# Patient Record
Sex: FEMALE | Race: BLACK OR AFRICAN AMERICAN | NOT HISPANIC OR LATINO | URBAN - METROPOLITAN AREA
[De-identification: names, ages, dates, MRNs, and addresses within clinical notes are randomized per-mention and may not be internally consistent; named-entity substitution may affect disease eponyms.]

---

## 2021-10-12 ENCOUNTER — EMERGENCY (EMERGENCY)
Facility: HOSPITAL | Age: 81
LOS: 1 days | Discharge: HOME | End: 2021-10-12
Attending: EMERGENCY MEDICINE | Admitting: EMERGENCY MEDICINE
Payer: MEDICARE

## 2021-10-12 VITALS
HEART RATE: 88 BPM | DIASTOLIC BLOOD PRESSURE: 55 MMHG | TEMPERATURE: 99 F | SYSTOLIC BLOOD PRESSURE: 113 MMHG | RESPIRATION RATE: 18 BRPM | OXYGEN SATURATION: 97 %

## 2021-10-12 PROCEDURE — 99285 EMERGENCY DEPT VISIT HI MDM: CPT | Mod: 25

## 2021-10-12 PROCEDURE — 93010 ELECTROCARDIOGRAM REPORT: CPT

## 2021-10-12 RX ORDER — ACETAMINOPHEN 500 MG
650 TABLET ORAL ONCE
Refills: 0 | Status: DISCONTINUED | OUTPATIENT
Start: 2021-10-12 | End: 2021-10-13

## 2021-10-12 NOTE — ED ADULT TRIAGE NOTE - DOMESTIC TRAVEL HIGH RISK QUESTION
Health Maintenance Summary     Topic Due On Due Status Completed On    IMMUNIZATION - DTaP/Tdap/Td Jun 16, 2024 Not Due Jun 16, 2014    Immunization-Influenza  Completed Nov 20, 2017    Depression Screening Dec 12, 2018 Not Due Dec 12, 2017          Patient is up to date, no discussion needed .             No

## 2021-10-12 NOTE — ED ADULT NURSE NOTE - NSIMPLEMENTINTERV_GEN_ALL_ED
Implemented All Fall Risk Interventions:  [place] to call system. Call bell, personal items and telephone within reach. Instruct patient to call for assistance. Room bathroom lighting operational. Non-slip footwear when patient is off stretcher. Physically safe environment: no spills, clutter or unnecessary equipment. Stretcher in lowest position, wheels locked, appropriate side rails in place. Provide visual cue, wrist band, yellow gown, etc. Monitor gait and stability. Monitor for mental status changes and reorient to person, place, and time. Review medications for side effects contributing to fall risk. Reinforce activity limits and safety measures with patient and family.

## 2021-10-12 NOTE — ED ADULT TRIAGE NOTE - CHIEF COMPLAINT QUOTE
p/t c/o of back pain s/p loss of balance and fall this afternoon, neg loc, witnessed by   p/t baseline with dementia, awake and responsive,

## 2021-10-12 NOTE — ED ADULT NURSE NOTE - OBJECTIVE STATEMENT
80yo female received in room 29. pt A&Ox2, baseline dementia, c/o back pain after fall this afternoon, pt states she lose her balance and hit her head, denies loc or blood thinner use. no apparent trauma noted. respiration even and non-labored. In nad. MD kasper in progress. family at bedside. Side rails up, bed at lowest position, call bell within reach, patient oriented to the unit, safety maintained.

## 2021-10-13 VITALS
OXYGEN SATURATION: 100 % | TEMPERATURE: 98 F | HEART RATE: 60 BPM | SYSTOLIC BLOOD PRESSURE: 111 MMHG | RESPIRATION RATE: 18 BRPM | DIASTOLIC BLOOD PRESSURE: 55 MMHG

## 2021-10-13 LAB
ALBUMIN SERPL ELPH-MCNC: 4.1 G/DL — SIGNIFICANT CHANGE UP (ref 3.3–5)
ALP SERPL-CCNC: 62 U/L — SIGNIFICANT CHANGE UP (ref 40–120)
ALT FLD-CCNC: 27 U/L — SIGNIFICANT CHANGE UP (ref 4–33)
ANION GAP SERPL CALC-SCNC: 15 MMOL/L — HIGH (ref 7–14)
ANISOCYTOSIS BLD QL: SLIGHT — SIGNIFICANT CHANGE UP
APPEARANCE UR: ABNORMAL
AST SERPL-CCNC: 23 U/L — SIGNIFICANT CHANGE UP (ref 4–32)
BACTERIA # UR AUTO: NEGATIVE — SIGNIFICANT CHANGE UP
BASOPHILS # BLD AUTO: 0.02 K/UL — SIGNIFICANT CHANGE UP (ref 0–0.2)
BASOPHILS # BLD AUTO: 0.03 K/UL — SIGNIFICANT CHANGE UP (ref 0–0.2)
BASOPHILS NFR BLD AUTO: 0.2 % — SIGNIFICANT CHANGE UP (ref 0–2)
BASOPHILS NFR BLD AUTO: 0.3 % — SIGNIFICANT CHANGE UP (ref 0–2)
BILIRUB SERPL-MCNC: 0.7 MG/DL — SIGNIFICANT CHANGE UP (ref 0.2–1.2)
BILIRUB UR-MCNC: NEGATIVE — SIGNIFICANT CHANGE UP
BUN SERPL-MCNC: 14 MG/DL — SIGNIFICANT CHANGE UP (ref 7–23)
CALCIUM SERPL-MCNC: 9.9 MG/DL — SIGNIFICANT CHANGE UP (ref 8.4–10.5)
CHLORIDE SERPL-SCNC: 100 MMOL/L — SIGNIFICANT CHANGE UP (ref 98–107)
CO2 SERPL-SCNC: 22 MMOL/L — SIGNIFICANT CHANGE UP (ref 22–31)
COLOR SPEC: SIGNIFICANT CHANGE UP
CREAT SERPL-MCNC: 0.63 MG/DL — SIGNIFICANT CHANGE UP (ref 0.5–1.3)
DIFF PNL FLD: NEGATIVE — SIGNIFICANT CHANGE UP
EOSINOPHIL # BLD AUTO: 0.01 K/UL — SIGNIFICANT CHANGE UP (ref 0–0.5)
EOSINOPHIL # BLD AUTO: 0.04 K/UL — SIGNIFICANT CHANGE UP (ref 0–0.5)
EOSINOPHIL NFR BLD AUTO: 0.1 % — SIGNIFICANT CHANGE UP (ref 0–6)
EOSINOPHIL NFR BLD AUTO: 0.4 % — SIGNIFICANT CHANGE UP (ref 0–6)
EPI CELLS # UR: 3 /HPF — SIGNIFICANT CHANGE UP (ref 0–5)
GLUCOSE SERPL-MCNC: 260 MG/DL — HIGH (ref 70–99)
GLUCOSE UR QL: ABNORMAL
HCT VFR BLD CALC: 38.5 % — SIGNIFICANT CHANGE UP (ref 34.5–45)
HCT VFR BLD CALC: 39.7 % — SIGNIFICANT CHANGE UP (ref 34.5–45)
HGB BLD-MCNC: 12.9 G/DL — SIGNIFICANT CHANGE UP (ref 11.5–15.5)
HGB BLD-MCNC: 14.2 G/DL — SIGNIFICANT CHANGE UP (ref 11.5–15.5)
HYALINE CASTS # UR AUTO: 1 /LPF — SIGNIFICANT CHANGE UP (ref 0–7)
IANC: 6.91 K/UL — SIGNIFICANT CHANGE UP (ref 1.5–8.5)
IANC: 8.26 K/UL — SIGNIFICANT CHANGE UP (ref 1.5–8.5)
IMM GRANULOCYTES NFR BLD AUTO: 0.8 % — SIGNIFICANT CHANGE UP (ref 0–1.5)
IMM GRANULOCYTES NFR BLD AUTO: 0.9 % — SIGNIFICANT CHANGE UP (ref 0–1.5)
KETONES UR-MCNC: NEGATIVE — SIGNIFICANT CHANGE UP
LEUKOCYTE ESTERASE UR-ACNC: NEGATIVE — SIGNIFICANT CHANGE UP
LYMPHOCYTES # BLD AUTO: 0.92 K/UL — LOW (ref 1–3.3)
LYMPHOCYTES # BLD AUTO: 1.36 K/UL — SIGNIFICANT CHANGE UP (ref 1–3.3)
LYMPHOCYTES # BLD AUTO: 15.1 % — SIGNIFICANT CHANGE UP (ref 13–44)
LYMPHOCYTES # BLD AUTO: 9.4 % — LOW (ref 13–44)
MANUAL SMEAR VERIFICATION: SIGNIFICANT CHANGE UP
MCHC RBC-ENTMCNC: 29.7 PG — SIGNIFICANT CHANGE UP (ref 27–34)
MCHC RBC-ENTMCNC: 31.5 PG — SIGNIFICANT CHANGE UP (ref 27–34)
MCHC RBC-ENTMCNC: 33.5 GM/DL — SIGNIFICANT CHANGE UP (ref 32–36)
MCHC RBC-ENTMCNC: 35.8 GM/DL — SIGNIFICANT CHANGE UP (ref 32–36)
MCV RBC AUTO: 88 FL — SIGNIFICANT CHANGE UP (ref 80–100)
MCV RBC AUTO: 88.7 FL — SIGNIFICANT CHANGE UP (ref 80–100)
MICROCYTES BLD QL: SLIGHT — SIGNIFICANT CHANGE UP
MONOCYTES # BLD AUTO: 0.5 K/UL — SIGNIFICANT CHANGE UP (ref 0–0.9)
MONOCYTES # BLD AUTO: 0.61 K/UL — SIGNIFICANT CHANGE UP (ref 0–0.9)
MONOCYTES NFR BLD AUTO: 5.1 % — SIGNIFICANT CHANGE UP (ref 2–14)
MONOCYTES NFR BLD AUTO: 6.8 % — SIGNIFICANT CHANGE UP (ref 2–14)
NEUTROPHILS # BLD AUTO: 6.91 K/UL — SIGNIFICANT CHANGE UP (ref 1.8–7.4)
NEUTROPHILS # BLD AUTO: 8.26 K/UL — HIGH (ref 1.8–7.4)
NEUTROPHILS NFR BLD AUTO: 76.7 % — SIGNIFICANT CHANGE UP (ref 43–77)
NEUTROPHILS NFR BLD AUTO: 84.2 % — HIGH (ref 43–77)
NITRITE UR-MCNC: NEGATIVE — SIGNIFICANT CHANGE UP
NRBC # BLD: 0 /100 WBCS — SIGNIFICANT CHANGE UP
NRBC # BLD: 0 /100 WBCS — SIGNIFICANT CHANGE UP
NRBC # FLD: 0 K/UL — SIGNIFICANT CHANGE UP
NRBC # FLD: 0 K/UL — SIGNIFICANT CHANGE UP
PH UR: 6 — SIGNIFICANT CHANGE UP (ref 5–8)
PLAT MORPH BLD: ABNORMAL
PLATELET # BLD AUTO: 143 K/UL — LOW (ref 150–400)
PLATELET # BLD AUTO: 71 K/UL — LOW (ref 150–400)
PLATELET CLUMP BLD QL SMEAR: ABNORMAL
PLATELET COUNT - ESTIMATE: ADEQUATE — SIGNIFICANT CHANGE UP
POTASSIUM SERPL-MCNC: 4.4 MMOL/L — SIGNIFICANT CHANGE UP (ref 3.5–5.3)
POTASSIUM SERPL-SCNC: 4.4 MMOL/L — SIGNIFICANT CHANGE UP (ref 3.5–5.3)
PROT SERPL-MCNC: 7.3 G/DL — SIGNIFICANT CHANGE UP (ref 6–8.3)
PROT UR-MCNC: NEGATIVE — SIGNIFICANT CHANGE UP
RBC # BLD: 4.34 M/UL — SIGNIFICANT CHANGE UP (ref 3.8–5.2)
RBC # BLD: 4.51 M/UL — SIGNIFICANT CHANGE UP (ref 3.8–5.2)
RBC # FLD: 13 % — SIGNIFICANT CHANGE UP (ref 10.3–14.5)
RBC # FLD: 13.1 % — SIGNIFICANT CHANGE UP (ref 10.3–14.5)
RBC BLD AUTO: ABNORMAL
RBC CASTS # UR COMP ASSIST: 5 /HPF — HIGH (ref 0–4)
SODIUM SERPL-SCNC: 137 MMOL/L — SIGNIFICANT CHANGE UP (ref 135–145)
SP GR SPEC: 1.02 — SIGNIFICANT CHANGE UP (ref 1–1.05)
UROBILINOGEN FLD QL: SIGNIFICANT CHANGE UP
WBC # BLD: 9.01 K/UL — SIGNIFICANT CHANGE UP (ref 3.8–10.5)
WBC # BLD: 9.81 K/UL — SIGNIFICANT CHANGE UP (ref 3.8–10.5)
WBC # FLD AUTO: 9.01 K/UL — SIGNIFICANT CHANGE UP (ref 3.8–10.5)
WBC # FLD AUTO: 9.81 K/UL — SIGNIFICANT CHANGE UP (ref 3.8–10.5)
WBC UR QL: 5 /HPF — SIGNIFICANT CHANGE UP (ref 0–5)

## 2021-10-13 PROCEDURE — 70450 CT HEAD/BRAIN W/O DYE: CPT | Mod: 26

## 2021-10-13 RX ORDER — ACETAMINOPHEN 500 MG
1000 TABLET ORAL ONCE
Refills: 0 | Status: COMPLETED | OUTPATIENT
Start: 2021-10-13 | End: 2021-10-13

## 2021-10-13 RX ADMIN — Medication 400 MILLIGRAM(S): at 00:51

## 2021-10-13 NOTE — ED PROVIDER NOTE - OBJECTIVE STATEMENT
80YO F hx of dementia, HTN, DM, p/w fall onto head and back. felt off balance while walking, fell from standing height, head and upper back hit the ground. pt ambulatory after. no change to baseline mental status. pt denying headache, neck pain, endorses T spine pain, no focal neuro deficits. not on AC. denies LOC.

## 2021-10-13 NOTE — ED PROVIDER NOTE - ATTENDING CONTRIBUTION TO CARE
82 yo with dementia HTN DM with a mechanical fall after falling from standing due to losing her balance while walking.  Head and back hit ground.   There was no LOC.  Was able to ambulate after.  No HA and no AC.    Gen: Well appearing in NAD  Head: NC/AT  Neck: trachea midline  Resp:  No distress  Ext: no deformities  Neuro:  A&O appears non focal  Skin:  Warm and dry as visualized  Psych:  Normal affect and mood     82 yo with mechanical fall.  Will get imaging to look for bleed or fracture.  UA and labs to look for other etiologies.

## 2021-10-13 NOTE — ED PROVIDER NOTE - CARE PLAN
1 Principal Discharge DX:	Back pain  Secondary Diagnosis:	Accident due to mechanical fall without injury

## 2021-10-13 NOTE — PROVIDER CONTACT NOTE (OTHER) - ACTION/TREATMENT ORDERED:
[facility] contacted [facility] to schedule ambulette transport;  arranged for 8:00 a.m. trip # [phone].  Pt to pay upon receipt of bill, if not the bill to [facility].

## 2021-10-13 NOTE — PROVIDER CONTACT NOTE (OTHER) - ASSESSMENT
Pt's  at bedside reports they have no one to pick them up; pt is unsteady on her feet.  Pt and  agreeable to ambulette transport home; they are requesting to be billed for the ambulette trip as they do not have the cash on them at this time.

## 2021-10-13 NOTE — ED PROVIDER NOTE - CLINICAL SUMMARY MEDICAL DECISION MAKING FREE TEXT BOX
Leslie Jacobson MD, PGY-2: 80YO F hx of dementia, HTN, DM, p/w mechanical fall onto head and back. not on AC. VS: initially bradycardic 47, now improved and stable. PE: no midline spinal TTP, no scalp hematoma. low suspicion intracranial hemorrhage or c spine fracture given VS and PE, but will get CT head and C spine based on mechanism of injury. low suspicion syncope but given hx of dementia, plan for basic labs and ua.

## 2021-10-13 NOTE — ED PROVIDER NOTE - NS ED ROS FT
Gen: Denies fevers  HEENT: denies h/a, neck pain  CV: Denies chest pain  Skin: denies bruising, bleeding  Resp: Denies SOB, cough  GI: Denies nausea, vomiting  Msk: + middle back pain  : Denies dysuria  Neuro: Denies LOC  Psych: Denies changes to baseline mental status

## 2021-10-13 NOTE — ED PROVIDER NOTE - PHYSICAL EXAMINATION
Gen: WDWN, NAD  HEENT: EOMI, no nasal discharge, mucous membranes moist. no nelson sign, no septal hematoma.   CV: 2+ radial pulses b/l  Resp: no accessory muscle use, no increased work of breathing  GI: Abdomen soft non-distended, NTTP  MSK: No open wounds, no bruising, no LE edema. no midline spine TTP. no c spine TTP, no pelvic instability   Neuro: A&Ox2, following commands, moving all four extremities spontaneously  Psych: appropriate mood

## 2021-10-13 NOTE — ED PROVIDER NOTE - PATIENT PORTAL LINK FT
You can access the FollowMyHealth Patient Portal offered by [facility] by registering at the following website: [email or web address]. By joining [facility]’s FollowMyHealth portal, you will also be able to view your health information using other applications (apps) compatible with our system.

## 2021-10-13 NOTE — ED PROVIDER NOTE - NSFOLLOWUPINSTRUCTIONS_ED_ALL_ED_FT
--take tylenol or motrin as needed for pain. Take tylenol 650 mg every 6 hours alternating with motrin 600 mg every 6 hours (take tylenol or motrin then three hours later take the other then three hours later take the first).  --today, the lab tests we did include blood and urine studies, the imaging tests we did include CT head. results significant for no abnormalities. we have included these test results in your paperwork. please take them to your follow-up appointment  --given that you were in the ED today, we recommend a followup visit with your general doctor (primary care doctor) within 7 days.   --your diagnosis is: back pain, mechanical fall  --return to the ED if recurrent falls, if nausea/vomiting, blurry vision, if changes to baseline mental status

## 2021-10-14 LAB
CULTURE RESULTS: SIGNIFICANT CHANGE UP
SPECIMEN SOURCE: SIGNIFICANT CHANGE UP

## 2022-03-12 ENCOUNTER — INPATIENT (INPATIENT)
Facility: HOSPITAL | Age: 82
LOS: 9 days | Discharge: HOSPICE | End: 2022-03-22
Attending: INTERNAL MEDICINE | Admitting: INTERNAL MEDICINE
Payer: MEDICARE

## 2022-03-12 VITALS
DIASTOLIC BLOOD PRESSURE: 70 MMHG | RESPIRATION RATE: 16 BRPM | WEIGHT: 91.93 LBS | HEIGHT: 65 IN | SYSTOLIC BLOOD PRESSURE: 108 MMHG | TEMPERATURE: 98 F | OXYGEN SATURATION: 98 % | HEART RATE: 65 BPM

## 2022-03-12 DIAGNOSIS — R53.1 WEAKNESS: ICD-10-CM

## 2022-03-12 LAB
ACETONE SERPL-MCNC: NEGATIVE — SIGNIFICANT CHANGE UP
ALBUMIN SERPL ELPH-MCNC: 2.8 G/DL — LOW (ref 3.3–5)
ALP SERPL-CCNC: 79 U/L — SIGNIFICANT CHANGE UP (ref 40–120)
ALT FLD-CCNC: 111 U/L — HIGH (ref 12–78)
ANION GAP SERPL CALC-SCNC: 4 MMOL/L — LOW (ref 5–17)
APPEARANCE UR: ABNORMAL
AST SERPL-CCNC: 61 U/L — HIGH (ref 15–37)
BACTERIA # UR AUTO: ABNORMAL
BASOPHILS # BLD AUTO: 0.04 K/UL — SIGNIFICANT CHANGE UP (ref 0–0.2)
BASOPHILS NFR BLD AUTO: 0.4 % — SIGNIFICANT CHANGE UP (ref 0–2)
BILIRUB SERPL-MCNC: 1.2 MG/DL — SIGNIFICANT CHANGE UP (ref 0.2–1.2)
BILIRUB UR-MCNC: NEGATIVE — SIGNIFICANT CHANGE UP
BUN SERPL-MCNC: 46 MG/DL — HIGH (ref 7–23)
CALCIUM SERPL-MCNC: 9.5 MG/DL — SIGNIFICANT CHANGE UP (ref 8.5–10.1)
CHLORIDE SERPL-SCNC: 120 MMOL/L — HIGH (ref 96–108)
CK MB BLD-MCNC: 1.4 % — SIGNIFICANT CHANGE UP (ref 0–3.5)
CK MB CFR SERPL CALC: 4.1 NG/ML — HIGH (ref 0.5–3.6)
CK SERPL-CCNC: 297 U/L — HIGH (ref 26–192)
CO2 SERPL-SCNC: 27 MMOL/L — SIGNIFICANT CHANGE UP (ref 22–31)
COLOR SPEC: YELLOW — SIGNIFICANT CHANGE UP
CREAT SERPL-MCNC: 0.84 MG/DL — SIGNIFICANT CHANGE UP (ref 0.5–1.3)
DIFF PNL FLD: ABNORMAL
EGFR: 70 ML/MIN/1.73M2 — SIGNIFICANT CHANGE UP
EOSINOPHIL # BLD AUTO: 0.01 K/UL — SIGNIFICANT CHANGE UP (ref 0–0.5)
EOSINOPHIL NFR BLD AUTO: 0.1 % — SIGNIFICANT CHANGE UP (ref 0–6)
EPI CELLS # UR: ABNORMAL
FLUAV AG NPH QL: SIGNIFICANT CHANGE UP
FLUBV AG NPH QL: SIGNIFICANT CHANGE UP
GLUCOSE BLDC GLUCOMTR-MCNC: 236 MG/DL — HIGH (ref 70–99)
GLUCOSE BLDC GLUCOMTR-MCNC: 333 MG/DL — HIGH (ref 70–99)
GLUCOSE SERPL-MCNC: 319 MG/DL — HIGH (ref 70–99)
GLUCOSE UR QL: 1000 MG/DL
HCT VFR BLD CALC: 41.6 % — SIGNIFICANT CHANGE UP (ref 34.5–45)
HGB BLD-MCNC: 13.5 G/DL — SIGNIFICANT CHANGE UP (ref 11.5–15.5)
IMM GRANULOCYTES NFR BLD AUTO: 0.3 % — SIGNIFICANT CHANGE UP (ref 0–1.5)
KETONES UR-MCNC: NEGATIVE — SIGNIFICANT CHANGE UP
LACTATE SERPL-SCNC: 2.1 MMOL/L — HIGH (ref 0.7–2)
LEUKOCYTE ESTERASE UR-ACNC: ABNORMAL
LYMPHOCYTES # BLD AUTO: 1.67 K/UL — SIGNIFICANT CHANGE UP (ref 1–3.3)
LYMPHOCYTES # BLD AUTO: 17.3 % — SIGNIFICANT CHANGE UP (ref 13–44)
MCHC RBC-ENTMCNC: 29.7 PG — SIGNIFICANT CHANGE UP (ref 27–34)
MCHC RBC-ENTMCNC: 32.5 G/DL — SIGNIFICANT CHANGE UP (ref 32–36)
MCV RBC AUTO: 91.4 FL — SIGNIFICANT CHANGE UP (ref 80–100)
MONOCYTES # BLD AUTO: 0.88 K/UL — SIGNIFICANT CHANGE UP (ref 0–0.9)
MONOCYTES NFR BLD AUTO: 9.1 % — SIGNIFICANT CHANGE UP (ref 2–14)
NEUTROPHILS # BLD AUTO: 7.05 K/UL — SIGNIFICANT CHANGE UP (ref 1.8–7.4)
NEUTROPHILS NFR BLD AUTO: 72.8 % — SIGNIFICANT CHANGE UP (ref 43–77)
NITRITE UR-MCNC: NEGATIVE — SIGNIFICANT CHANGE UP
NRBC # BLD: 0 /100 WBCS — SIGNIFICANT CHANGE UP (ref 0–0)
NT-PROBNP SERPL-SCNC: 443 PG/ML — SIGNIFICANT CHANGE UP (ref 0–450)
PH UR: 7 — SIGNIFICANT CHANGE UP (ref 5–8)
PLATELET # BLD AUTO: 204 K/UL — SIGNIFICANT CHANGE UP (ref 150–400)
POTASSIUM SERPL-MCNC: 4.3 MMOL/L — SIGNIFICANT CHANGE UP (ref 3.5–5.3)
POTASSIUM SERPL-SCNC: 4.3 MMOL/L — SIGNIFICANT CHANGE UP (ref 3.5–5.3)
PROT SERPL-MCNC: 7.2 GM/DL — SIGNIFICANT CHANGE UP (ref 6–8.3)
PROT UR-MCNC: 100 MG/DL
RBC # BLD: 4.55 M/UL — SIGNIFICANT CHANGE UP (ref 3.8–5.2)
RBC # FLD: 15.2 % — HIGH (ref 10.3–14.5)
RBC CASTS # UR COMP ASSIST: ABNORMAL /HPF (ref 0–4)
SARS-COV-2 RNA SPEC QL NAA+PROBE: SIGNIFICANT CHANGE UP
SODIUM SERPL-SCNC: 151 MMOL/L — HIGH (ref 135–145)
SP GR SPEC: 1.01 — SIGNIFICANT CHANGE UP (ref 1.01–1.02)
TROPONIN I, HIGH SENSITIVITY RESULT: 83.8 NG/L — HIGH
URATE CRY FLD QL MICRO: ABNORMAL
UROBILINOGEN FLD QL: 1 MG/DL
WBC # BLD: 9.68 K/UL — SIGNIFICANT CHANGE UP (ref 3.8–10.5)
WBC # FLD AUTO: 9.68 K/UL — SIGNIFICANT CHANGE UP (ref 3.8–10.5)
WBC UR QL: >50

## 2022-03-12 PROCEDURE — 71045 X-RAY EXAM CHEST 1 VIEW: CPT | Mod: 26

## 2022-03-12 PROCEDURE — 99285 EMERGENCY DEPT VISIT HI MDM: CPT

## 2022-03-12 PROCEDURE — 99223 1ST HOSP IP/OBS HIGH 75: CPT

## 2022-03-12 PROCEDURE — 70450 CT HEAD/BRAIN W/O DYE: CPT | Mod: 26,MA

## 2022-03-12 RX ORDER — SODIUM CHLORIDE 9 MG/ML
1000 INJECTION, SOLUTION INTRAVENOUS
Refills: 0 | Status: DISCONTINUED | OUTPATIENT
Start: 2022-03-12 | End: 2022-03-13

## 2022-03-12 RX ORDER — INFLUENZA VIRUS VACCINE 15; 15; 15; 15 UG/.5ML; UG/.5ML; UG/.5ML; UG/.5ML
0.7 SUSPENSION INTRAMUSCULAR ONCE
Refills: 0 | Status: DISCONTINUED | OUTPATIENT
Start: 2022-03-12 | End: 2022-03-22

## 2022-03-12 RX ORDER — CEFTRIAXONE 500 MG/1
1000 INJECTION, POWDER, FOR SOLUTION INTRAMUSCULAR; INTRAVENOUS ONCE
Refills: 0 | Status: COMPLETED | OUTPATIENT
Start: 2022-03-12 | End: 2022-03-12

## 2022-03-12 RX ORDER — HEPARIN SODIUM 5000 [USP'U]/ML
5000 INJECTION INTRAVENOUS; SUBCUTANEOUS EVERY 12 HOURS
Refills: 0 | Status: DISCONTINUED | OUTPATIENT
Start: 2022-03-12 | End: 2022-03-22

## 2022-03-12 RX ADMIN — HEPARIN SODIUM 5000 UNIT(S): 5000 INJECTION INTRAVENOUS; SUBCUTANEOUS at 17:08

## 2022-03-12 RX ADMIN — CEFTRIAXONE 100 MILLIGRAM(S): 500 INJECTION, POWDER, FOR SOLUTION INTRAMUSCULAR; INTRAVENOUS at 14:40

## 2022-03-12 RX ADMIN — SODIUM CHLORIDE 100 MILLILITER(S): 9 INJECTION, SOLUTION INTRAVENOUS at 16:50

## 2022-03-12 NOTE — H&P ADULT - NSHPLABSRESULTS_GEN_ALL_CORE
LABS:                        13.5   9.68  )-----------( 204      ( 12 Mar 2022 11:04 )             41.6     03-12    151<H>  |  120<H>  |  46<H>  ----------------------------<  319<H>  4.3   |  27  |  0.84    Ca    9.5      12 Mar 2022 11:37    TPro  7.2  /  Alb  2.8<L>  /  TBili  1.2  /  DBili  x   /  AST  61<H>  /  ALT  111<H>  /  AlkPhos  79  03-12      Urinalysis Basic - ( 12 Mar 2022 13:06 )    Color: Yellow / Appearance: Slightly Turbid / S.010 / pH: x  Gluc: x / Ketone: Negative  / Bili: Negative / Urobili: 1 mg/dL   Blood: x / Protein: 100 mg/dL / Nitrite: Negative   Leuk Esterase: Moderate / RBC: 11-25 /HPF / WBC >50   Sq Epi: x / Non Sq Epi: Moderate / Bacteria: Moderate          RADIOLOGY & ADDITIONAL TESTS:

## 2022-03-12 NOTE — ED ADULT NURSE NOTE - OBJECTIVE STATEMENT
pt is a 81 year old female, advanced alzheimer's, DM presents with hypergylcemia, altered mental status , hyperglycemia . fingerstick was 455 and 200ml of NS given.

## 2022-03-12 NOTE — H&P ADULT - ASSESSMENT
81 year old female PMH DM, HLD, advanced dementia presents today frannie accompanied with her  who states that she has not been herself since yesterday at 1pm, yesterday he first noticed that pt was not able to eat even with her dentures in. Also appeared SOB and moaning as if she were in pain, but when he would ask her if she was in pain, pt would respond no. Since waking up at 6 am today he feels that she is not speaking and unable to understand what she is saying. Patient appears in pain when he moves her in bed and reports that she has bed sores which he has been applying A & D ointment to.     Plan: Admit to medicine for dehydration and UTI. IVF now with /h. Sodium high at 151 but will avoid D5W given high BS at 319. Continue Ceftriaxone   1 gram daily, follow urine culture. UA appears infected with WBC > 50. Follow urine culture.  Speech and Swallow eval ordered to assign   proper diet, needs dysphagia diet. CXR is clear, CT head no acute findings.     Will need to discuss with  home meds.     Wound care ordered for sacral decub found on arrival.   81 year old female PMH DM, HLD, advanced dementia presents today frannie accompanied with her  who states that she has not been herself since yesterday at 1pm, yesterday he first noticed that pt was not able to eat even with her dentures in. Also appeared SOB and moaning as if she were in pain, but when he would ask her if she was in pain, pt would respond no. Since waking up at 6 am today he feels that she is not speaking and unable to understand what she is saying. Patient appears in pain when he moves her in bed and reports that she has bed sores which he has been applying A & D ointment to.     Plan: Admit to medicine for dehydration and UTI. IVF now with /h. Sodium high at 151 but will avoid D5W given high BS at 319. Continue Ceftriaxone   1 gram daily, follow urine culture. UA appears infected with WBC > 50. Follow urine culture.  Speech and Swallow eval ordered to assign   proper diet, needs dysphagia diet. CXR is clear, CT head no acute findings.  Trop high on arrival at 82, likely false elevation, follow repeat in AM.     Glucose 319 on arrival, SSC for now dimitry NPO.     Will need to discuss with  home meds.     Wound care ordered for sacral decub found on arrival.   81 year old female PMH DM, HLD, advanced dementia presents today frannie accompanied with her  who states that she has not been herself since yesterday at 1pm, yesterday he first noticed that pt was not able to eat even with her dentures in. Also appeared SOB and moaning as if she were in pain, but when he would ask her if she was in pain, pt would respond no. Since waking up at 6 am today he feels that she is not speaking and unable to understand what she is saying. Patient appears in pain when he moves her in bed and reports that she has bed sores which he has been applying A & D ointment to.     Plan: Admit to medicine for dehydration and UTI. IVF now with /h. Sodium high at 151 but will avoid D5W given high BS at 319. Continue Ceftriaxone   1 gram daily, follow urine culture. UA appears infected with WBC > 50. Follow urine culture.  Speech and Swallow eval ordered to assign   proper diet, needs dysphagia diet. CXR is clear, CT head no acute findings.  Trop high on arrival at 82, likely false elevation, follow repeat in AM.     Glucose 319 on arrival, SSC for now dimitry NPO.     Will need to confirm home meds with  in AM.       Wound care ordered for sacral decub found on arrival.  Needs Palliative Care eval on Monday.

## 2022-03-12 NOTE — PATIENT PROFILE ADULT - FALL HARM RISK - HARM RISK INTERVENTIONS
Pt given discharge instructions and aware the covid swab results takes 48 hrs.   Pt also informed that the [facility] center will be contacting him for evaluation of treatment, and pt also aware to call his primary care physician to set up follow up appointment     Jessie Agrawal RN  11/14/21 1940 Assistance OOB with selected safe patient handling equipment/Communicate Risk of Fall with Harm to all staff/Reinforce activity limits and safety measures with patient and family/Tailored Fall Risk Interventions/Visual Cue: Yellow wristband and red socks/Bed in lowest position, wheels locked, appropriate side rails in place/Call bell, personal items and telephone in reach/Instruct patient to call for assistance before getting out of bed or chair/Non-slip footwear when patient is out of bed/[place] to call system/Physically safe environment - no spills, clutter or unnecessary equipment/Purposeful Proactive Rounding/Room/bathroom lighting operational, light cord in reach

## 2022-03-12 NOTE — ED PROVIDER NOTE - CLINICAL SUMMARY MEDICAL DECISION MAKING FREE TEXT BOX
pt presents today with ams, has h/o advanced dementia, now not eating or chewing and not verbalizing, ct head negative, ua shows uti, started on ivf and iv antibiotics, pt admitted for further treatment

## 2022-03-12 NOTE — H&P ADULT - NSHPPHYSICALEXAM_GEN_ALL_CORE
PHYSICAL EXAMINATION:  Vital Signs Last 24 Hrs  T(C): 36.6 (12 Mar 2022 09:29), Max: 36.6 (12 Mar 2022 09:29)  T(F): 97.9 (12 Mar 2022 09:29), Max: 97.9 (12 Mar 2022 09:29)  HR: 65 (12 Mar 2022 09:29) (65 - 65)  BP: 108/70 (12 Mar 2022 09:29) (108/70 - 108/70)  BP(mean): --  RR: 16 (12 Mar 2022 09:29) (16 - 16)  SpO2: 98% (12 Mar 2022 09:29) (98% - 98%)  CAPILLARY BLOOD GLUCOSE      POCT Blood Glucose.: 333 mg/dL (12 Mar 2022 09:32)      GENERAL: NAD, well-groomed, well-developed  HEAD:  atraumatic, normocephalic  EYES: sclera anicteric  ENMT: mucous membranes moist  NECK: supple, No JVD  CHEST/LUNG: clear to auscultation bilaterally; no rales, rhonchi, or wheezing b/l  HEART: normal S1, S2  ABDOMEN: BS+, soft, ND, NT   EXTREMITIES:  pulses palpable; no clubbing, cyanosis, or edema b/l LEs  NEURO: awake, alert, interactive; moves all extremities  SKIN: no rashes or lesions PHYSICAL EXAMINATION:  Vital Signs Last 24 Hrs  T(C): 36.6 (12 Mar 2022 09:29), Max: 36.6 (12 Mar 2022 09:29)  T(F): 97.9 (12 Mar 2022 09:29), Max: 97.9 (12 Mar 2022 09:29)  HR: 65 (12 Mar 2022 09:29) (65 - 65)  BP: 108/70 (12 Mar 2022 09:29) (108/70 - 108/70)  BP(mean): --  RR: 16 (12 Mar 2022 09:29) (16 - 16)  SpO2: 98% (12 Mar 2022 09:29) (98% - 98%)  CAPILLARY BLOOD GLUCOSE      POCT Blood Glucose.: 333 mg/dL (12 Mar 2022 09:32)      GENERAL: NAD, in bed, no CP, fevers or SOB, at baseline dementia  HEAD:  atraumatic, normocephalic  EYES: sclera anicteric  ENMT: mucous membranes moist  NECK: supple, No JVD  CHEST/LUNG: clear to auscultation bilaterally; no rales, rhonchi, or wheezing b/l  HEART: normal S1, S2  ABDOMEN: BS+, soft, ND, NT   EXTREMITIES:  pulses palpable; no clubbing, cyanosis, or edema b/l LEs  NEURO: awake, alert, interactive; moves all extremities  SKIN: no rashes or lesions

## 2022-03-12 NOTE — ED ADULT TRIAGE NOTE - CHIEF COMPLAINT QUOTE
brought by ems for altered mental status , hyperglycemia . fingerstick was 455 and 200ml of NS given. h/o alzheimer's, Diabetes

## 2022-03-12 NOTE — H&P ADULT - HISTORY OF PRESENT ILLNESS
81 year old female PMH DM, HLD, advanced dementia presents today biba accompanied with her  who states that she has not been herself since yesterday at 1pm, yesterday he first noticed that pt was not able to eat even with her dentures in. Also appeared SOB and moaning as if she were in pain, but when he would ask her if she was in pain, pt would respond no. Since waking up at 6 am today he feels that she is not speaking and unable to understand what she is saying. Patient appears in pain when he moves her in bed and reports that she has bed sores which he has been applying A & D ointment to.

## 2022-03-12 NOTE — ED PROVIDER NOTE - OBJECTIVE STATEMENT
81 year old female with h/o DM, HLD, advanced dementia presents today biba accompanied with her  who states that she has not been herself since yesterday at 1pm, yesterday he first noticed that pt was not able to eat even with her dentures in, also appeared SOB and moaning as if she were in pain, but when he would ask her if she was in pain, pt would respond no, since waking up at 6am he feels that she is not speaking and unable to understand what she is saying, pt appears in pain when he moves her in bed and reports that she has bed sores which he has been applying A & D ointment to,  (-) fevers (-) nausea or vomiting +constipation (which responded to otc meds)

## 2022-03-13 LAB
A1C WITH ESTIMATED AVERAGE GLUCOSE RESULT: 7.9 % — HIGH (ref 4–5.6)
ANION GAP SERPL CALC-SCNC: 2 MMOL/L — LOW (ref 5–17)
BUN SERPL-MCNC: 29 MG/DL — HIGH (ref 7–23)
CALCIUM SERPL-MCNC: 9.4 MG/DL — SIGNIFICANT CHANGE UP (ref 8.5–10.1)
CHLORIDE SERPL-SCNC: 122 MMOL/L — HIGH (ref 96–108)
CO2 SERPL-SCNC: 29 MMOL/L — SIGNIFICANT CHANGE UP (ref 22–31)
CREAT SERPL-MCNC: 0.58 MG/DL — SIGNIFICANT CHANGE UP (ref 0.5–1.3)
EGFR: 91 ML/MIN/1.73M2 — SIGNIFICANT CHANGE UP
ESTIMATED AVERAGE GLUCOSE: 180 MG/DL — HIGH (ref 68–114)
GLUCOSE BLDC GLUCOMTR-MCNC: 124 MG/DL — HIGH (ref 70–99)
GLUCOSE BLDC GLUCOMTR-MCNC: 133 MG/DL — HIGH (ref 70–99)
GLUCOSE BLDC GLUCOMTR-MCNC: 138 MG/DL — HIGH (ref 70–99)
GLUCOSE BLDC GLUCOMTR-MCNC: 164 MG/DL — HIGH (ref 70–99)
GLUCOSE SERPL-MCNC: 168 MG/DL — HIGH (ref 70–99)
LACTATE SERPL-SCNC: 2.2 MMOL/L — HIGH (ref 0.7–2)
MAGNESIUM SERPL-MCNC: 2.2 MG/DL — SIGNIFICANT CHANGE UP (ref 1.6–2.6)
POTASSIUM SERPL-MCNC: 4 MMOL/L — SIGNIFICANT CHANGE UP (ref 3.5–5.3)
POTASSIUM SERPL-SCNC: 4 MMOL/L — SIGNIFICANT CHANGE UP (ref 3.5–5.3)
SODIUM SERPL-SCNC: 153 MMOL/L — HIGH (ref 135–145)
TROPONIN I, HIGH SENSITIVITY RESULT: 71.1 NG/L — HIGH

## 2022-03-13 PROCEDURE — 99233 SBSQ HOSP IP/OBS HIGH 50: CPT

## 2022-03-13 RX ORDER — DEXTROSE 50 % IN WATER 50 %
25 SYRINGE (ML) INTRAVENOUS ONCE
Refills: 0 | Status: DISCONTINUED | OUTPATIENT
Start: 2022-03-13 | End: 2022-03-22

## 2022-03-13 RX ORDER — SODIUM CHLORIDE 9 MG/ML
1000 INJECTION, SOLUTION INTRAVENOUS
Refills: 0 | Status: DISCONTINUED | OUTPATIENT
Start: 2022-03-13 | End: 2022-03-14

## 2022-03-13 RX ORDER — GLUCAGON INJECTION, SOLUTION 0.5 MG/.1ML
1 INJECTION, SOLUTION SUBCUTANEOUS ONCE
Refills: 0 | Status: DISCONTINUED | OUTPATIENT
Start: 2022-03-13 | End: 2022-03-22

## 2022-03-13 RX ORDER — NYSTATIN CREAM 100000 [USP'U]/G
1 CREAM TOPICAL EVERY 8 HOURS
Refills: 0 | Status: DISCONTINUED | OUTPATIENT
Start: 2022-03-13 | End: 2022-03-22

## 2022-03-13 RX ORDER — INSULIN LISPRO 100/ML
VIAL (ML) SUBCUTANEOUS EVERY 6 HOURS
Refills: 0 | Status: DISCONTINUED | OUTPATIENT
Start: 2022-03-13 | End: 2022-03-14

## 2022-03-13 RX ORDER — SODIUM CHLORIDE 9 MG/ML
1000 INJECTION, SOLUTION INTRAVENOUS
Refills: 0 | Status: DISCONTINUED | OUTPATIENT
Start: 2022-03-13 | End: 2022-03-22

## 2022-03-13 RX ORDER — DEXTROSE 50 % IN WATER 50 %
12.5 SYRINGE (ML) INTRAVENOUS ONCE
Refills: 0 | Status: DISCONTINUED | OUTPATIENT
Start: 2022-03-13 | End: 2022-03-22

## 2022-03-13 RX ORDER — DEXTROSE 50 % IN WATER 50 %
15 SYRINGE (ML) INTRAVENOUS ONCE
Refills: 0 | Status: DISCONTINUED | OUTPATIENT
Start: 2022-03-13 | End: 2022-03-22

## 2022-03-13 RX ORDER — SODIUM CHLORIDE 9 MG/ML
1000 INJECTION INTRAMUSCULAR; INTRAVENOUS; SUBCUTANEOUS ONCE
Refills: 0 | Status: DISCONTINUED | OUTPATIENT
Start: 2022-03-13 | End: 2022-03-13

## 2022-03-13 RX ADMIN — HEPARIN SODIUM 5000 UNIT(S): 5000 INJECTION INTRAVENOUS; SUBCUTANEOUS at 17:05

## 2022-03-13 RX ADMIN — HEPARIN SODIUM 5000 UNIT(S): 5000 INJECTION INTRAVENOUS; SUBCUTANEOUS at 06:21

## 2022-03-13 RX ADMIN — NYSTATIN CREAM 1 APPLICATION(S): 100000 CREAM TOPICAL at 13:02

## 2022-03-13 RX ADMIN — SODIUM CHLORIDE 70 MILLILITER(S): 9 INJECTION, SOLUTION INTRAVENOUS at 13:27

## 2022-03-13 RX ADMIN — NYSTATIN CREAM 1 APPLICATION(S): 100000 CREAM TOPICAL at 22:09

## 2022-03-13 RX ADMIN — SODIUM CHLORIDE 100 MILLILITER(S): 9 INJECTION, SOLUTION INTRAVENOUS at 06:20

## 2022-03-13 NOTE — PROGRESS NOTE ADULT - ASSESSMENT
81 year old female PMH DM, HLD, advanced dementia presents today frannie accompanied with her  who states that she has not been herself since yesterday at 1pm, yesterday he first noticed that pt was not able to eat even with her dentures in. Also appeared SOB and moaning as if she were in pain, but when he would ask her if she was in pain, pt would respond no. Since waking up at 6 am today he feels that she is not speaking and unable to understand what she is saying. Patient appears in pain when he moves her in bed and reports that she has bed sores which he has been applying A & D ointment to.     Plan: Admit to medicine for dehydration and UTI. IVF now with /h. Sodium high at 151 but will avoid D5W given high BS at 319. Continue Ceftriaxone   1 gram daily, follow urine culture. UA appears infected with WBC > 50. Follow urine culture.  Speech and Swallow eval ordered to assign   proper diet, needs dysphagia diet. CXR is clear, CT head no acute findings.  Trop high on arrival at 82, likely false elevation, follow repeat in AM.     Glucose 319 on arrival, SSC for now dimitry NPO.     Will need to confirm home meds with  in AM.       Wound care ordered for sacral decub found on arrival.  Needs Palliative Care eval on Monday.   81 year old female PMH DM, HLD, advanced dementia presents today frannie accompanied with her  who states that she has not been herself since yesterday at 1pm, yesterday he first noticed that pt was not able to eat even with her dentures in. Also appeared SOB and moaning as if she were in pain, but when he would ask her if she was in pain, pt would respond no. Since waking up at 6 am today he feels that she is not speaking and unable to understand what she is saying. Patient appears in pain when he moves her in bed and reports that she has bed sores which he has been applying A & D ointment to.     Plan: Admit to medicine for dehydration and UTI. IVF now with /h. Sodium high at 151 but will avoid D5W given high BS at 319. Continue Ceftriaxone   1 gram daily, follow urine culture. UA appears infected with WBC > 50. Follow urine culture.  Speech and Swallow eval ordered to assign   proper diet, needs dysphagia diet. CXR is clear, CT head no acute findings.  Trop high on arrival at 82, likely false elevation    Metabolic encephalopathy 2/2 UTI  Lactate elevated  LR bolus --> C/W D5+1/2 NS @ 75cc/hr  NPO  Speech swallow study  Pt receieved ceftriaxone X1  F/U urine cultures    Demand ischemia 2/2 dehydration  troponins trending down  not reporting CP  F/U EKG    Dehydration  LR bolus    Advanced dementia  Palliative care consult    Hyperglycemia 2/2 uncontrolle diabetes  Received fluid bolus  HgA1c obtained  NPO, ISS Q6, FS Q6  Glucose 319 on arrival, SSC for now whille NPO.     HyperNa+  2/2 dehydration/hyperglycemia    DVT PPX  heparin    Wound care ordered for sacral decub found on arrival 81 year old female PMH DM, HLD, advanced dementia presents today frannie accompanied with her  who states that she has not been herself since yesterday at 1pm, yesterday he first noticed that pt was not able to eat even with her dentures in. Also appeared SOB and moaning as if she were in pain, but when he would ask her if she was in pain, pt would respond no. Since waking up at 6 am today he feels that she is not speaking and unable to understand what she is saying. Patient appears in pain when he moves her in bed and reports that she has bed sores which he has been applying A & D ointment to.     Plan: Admit to medicine for dehydration and UTI. IVF now with /h. Sodium high at 151 but will avoid D5W given high BS at 319. Continue Ceftriaxone   1 gram daily, follow urine culture. UA appears infected with WBC > 50. Follow urine culture.  Speech and Swallow eval ordered to assign   proper diet, needs dysphagia diet. CXR is clear, CT head no acute findings.  Trop high on arrival at 82, likely false elevation    Metabolic encephalopathy 2/2 UTI  Lactate elevated  LR bolus --> C/W D5+1/2 NS @ 75cc/hr  NPO  Speech swallow study  Pt receieved ceftriaxone X1  F/U urine cultures    Demand ischemia 2/2 dehydration  troponins trending down  not reporting CP  F/U EKG    Dehydration  LR bolus    Advanced dementia  Palliative care consult    Hyperglycemia 2/2 uncontrolle diabetes  Received fluid bolus  HgA1c obtained  NPO, ISS Q6, FS Q6  Glucose 319 on arrival, SSC for now whille NPO.     HyperNa+  2/2 dehydration/hyperglycemia    DVT PPX  heparin    Wound care ordered for sacral decub found on arrival    #Called all numbers in chart including emergency contact. No answer with spouse number incorrect. Female picked up on the other line stated its not the family of Mrs. Finch. Will need assistance to clarify code status and medications. Currently full code.

## 2022-03-13 NOTE — PROGRESS NOTE ADULT - SUBJECTIVE AND OBJECTIVE BOX
Patient is a 81y old  Female who presents with a chief complaint of Lethargy at home with decreased PO intake. (12 Mar 2022 14:40)    INTERVAL HPI/OVERNIGHT EVENTS: Patients seen and examined at bedside this morning. No acute events overnight. Pt reports    MEDICATIONS  (STANDING):  heparin   Injectable 5000 Unit(s) SubCutaneous every 12 hours  influenza  Vaccine (HIGH DOSE) 0.7 milliLiter(s) IntraMuscular once  lactated ringers. 1000 milliLiter(s) (100 mL/Hr) IV Continuous <Continuous>  nystatin Powder 1 Application(s) Topical every 8 hours    MEDICATIONS  (PRN):    Allergies    No Known Allergies    Intolerances      REVIEW OF SYSTEMS:  All other systems reviewed and are negative    Vital Signs Last 24 Hrs  T(C): 36.3 (13 Mar 2022 05:27), Max: 36.6 (12 Mar 2022 09:29)  T(F): 97.3 (13 Mar 2022 05:27), Max: 97.9 (12 Mar 2022 09:29)  HR: 75 (13 Mar 2022 05:27) (62 - 75)  BP: 126/68 (13 Mar 2022 05:27) (106/70 - 127/58)  BP(mean): --  RR: 18 (13 Mar 2022 05:27) (16 - 20)  SpO2: 97% (13 Mar 2022 05:27) (95% - 100%)  Daily Height in cm: 165.1 (12 Mar 2022 09:29)    Daily Weight in k.6 (12 Mar 2022 16:40)  I&O's Summary    12 Mar 2022 06:01  -  13 Mar 2022 07:00  --------------------------------------------------------  IN: 1200 mL / OUT: 0 mL / NET: 1200 mL      CAPILLARY BLOOD GLUCOSE      POCT Blood Glucose.: 236 mg/dL (12 Mar 2022 21:26)  POCT Blood Glucose.: 333 mg/dL (12 Mar 2022 09:32)    PHYSICAL EXAM:  GENERAL: NAD, well-groomed, well-developed  HEAD:  Atraumatic, Normocephalic  EYES: EOMI, PERRLA, conjunctiva and sclera clear  ENMT: No tonsillar erythema, exudates, or enlargement; Moist mucous membranes, Good dentition, No lesions  NECK: Supple, No JVD, Normal thyroid  NERVOUS SYSTEM:  Alert & Oriented X3, Good concentration; Motor Strength 5/5 B/L upper and lower extremities; DTRs 2+ intact and symmetric  CHEST/LUNG: Clear to percussion bilaterally; No rales, rhonchi, wheezing, or rubs  HEART: Regular rate and rhythm; No murmurs, rubs, or gallops  ABDOMEN: Soft, Nontender, Nondistended; Bowel sounds present  EXTREMITIES:  2+ Peripheral Pulses, No clubbing, cyanosis, or edema  LYMPH: No lymphadenopathy noted  SKIN: No rashes or lesions    Labs                          13.5   9.68  )-----------( 204      ( 12 Mar 2022 11:04 )             41.6     03-12    151<H>  |  120<H>  |  46<H>  ----------------------------<  319<H>  4.3   |  27  |  0.84    Ca    9.5      12 Mar 2022 11:37    TPro  7.2  /  Alb  2.8<L>  /  TBili  1.2  /  DBili  x   /  AST  61<H>  /  ALT  111<H>  /  AlkPhos  79  03-12      CARDIAC MARKERS ( 12 Mar 2022 11:37 )  x     / x     / 297 U/L / x     / 4.1 ng/mL      Urinalysis Basic - ( 12 Mar 2022 13:06 )    Color: Yellow / Appearance: Slightly Turbid / S.010 / pH: x  Gluc: x / Ketone: Negative  / Bili: Negative / Urobili: 1 mg/dL   Blood: x / Protein: 100 mg/dL / Nitrite: Negative   Leuk Esterase: Moderate / RBC: 11-25 /HPF / WBC >50   Sq Epi: x / Non Sq Epi: Moderate / Bacteria: Moderate                   Patient is a 81y old  Female who presents with a chief complaint of Lethargy at home with decreased PO intake. (12 Mar 2022 14:40)    INTERVAL HPI/OVERNIGHT EVENTS: Patients seen and examined at bedside this morning. No acute events overnight. Pt reports not in pain. Following some commands. Awake. Speaking, incoherently     MEDICATIONS  (STANDING):  heparin   Injectable 5000 Unit(s) SubCutaneous every 12 hours  influenza  Vaccine (HIGH DOSE) 0.7 milliLiter(s) IntraMuscular once  lactated ringers. 1000 milliLiter(s) (100 mL/Hr) IV Continuous <Continuous>  nystatin Powder 1 Application(s) Topical every 8 hours    MEDICATIONS  (PRN):    Allergies    No Known Allergies    Intolerances      REVIEW OF SYSTEMS:  All other systems reviewed and are negative    Vital Signs Last 24 Hrs  T(C): 36.3 (13 Mar 2022 05:27), Max: 36.6 (12 Mar 2022 09:29)  T(F): 97.3 (13 Mar 2022 05:27), Max: 97.9 (12 Mar 2022 09:29)  HR: 75 (13 Mar 2022 05:27) (62 - 75)  BP: 126/68 (13 Mar 2022 05:27) (106/70 - 127/58)  BP(mean): --  RR: 18 (13 Mar 2022 05:27) (16 - 20)  SpO2: 97% (13 Mar 2022 05:27) (95% - 100%)  Daily Height in cm: 165.1 (12 Mar 2022 09:29)    Daily Weight in k.6 (12 Mar 2022 16:40)  I&O's Summary    12 Mar 2022 06:01  -  13 Mar 2022 07:00  --------------------------------------------------------  IN: 1200 mL / OUT: 0 mL / NET: 1200 mL      CAPILLARY BLOOD GLUCOSE      POCT Blood Glucose.: 236 mg/dL (12 Mar 2022 21:26)  POCT Blood Glucose.: 333 mg/dL (12 Mar 2022 09:32)    PHYSICAL EXAM:  GENERAL: NAD, chronically ill appearing  HEAD:  Atraumatic, Normocephalic  EYES: EOMI, PERRLA, conjunctiva and sclera clear  ENMT: No tonsillar erythema, exudates, or enlargement; Dry mucous membranes, Poor dentition, No lesions  NECK: Supple, No JVD, Normal thyroid  NERVOUS SYSTEM:  Alert & Oriented X0, Poor concentration; Motor Strength 3/5 B/L upper and lower extremities; DTRs 2+ intact and symmetric  CHEST/LUNG: Clear to percussion bilaterally; No rales, rhonchi, wheezing, or rubs  HEART: Regular rate and rhythm; No murmurs, rubs, or gallops  ABDOMEN: Soft, Nontender, Nondistended; Bowel sounds present  EXTREMITIES:  2+ Peripheral Pulses, No clubbing, cyanosis, or edema  LYMPH: No lymphadenopathy noted  SKIN: sacral ulcers    Labs                          13.5   9.68  )-----------( 204      ( 12 Mar 2022 11:04 )             41.6     03-12    151<H>  |  120<H>  |  46<H>  ----------------------------<  319<H>  4.3   |  27  |  0.84    Ca    9.5      12 Mar 2022 11:37    TPro  7.2  /  Alb  2.8<L>  /  TBili  1.2  /  DBili  x   /  AST  61<H>  /  ALT  111<H>  /  AlkPhos  79  03-12      CARDIAC MARKERS ( 12 Mar 2022 11:37 )  x     / x     / 297 U/L / x     / 4.1 ng/mL      Urinalysis Basic - ( 12 Mar 2022 13:06 )    Color: Yellow / Appearance: Slightly Turbid / S.010 / pH: x  Gluc: x / Ketone: Negative  / Bili: Negative / Urobili: 1 mg/dL   Blood: x / Protein: 100 mg/dL / Nitrite: Negative   Leuk Esterase: Moderate / RBC: 11-25 /HPF / WBC >50   Sq Epi: x / Non Sq Epi: Moderate / Bacteria: Moderate

## 2022-03-14 DIAGNOSIS — L89.90 PRESSURE ULCER OF UNSPECIFIED SITE, UNSPECIFIED STAGE: ICD-10-CM

## 2022-03-14 DIAGNOSIS — N39.0 URINARY TRACT INFECTION, SITE NOT SPECIFIED: ICD-10-CM

## 2022-03-14 DIAGNOSIS — I48.91 UNSPECIFIED ATRIAL FIBRILLATION: ICD-10-CM

## 2022-03-14 DIAGNOSIS — E11.9 TYPE 2 DIABETES MELLITUS WITHOUT COMPLICATIONS: ICD-10-CM

## 2022-03-14 DIAGNOSIS — F03.90 UNSPECIFIED DEMENTIA WITHOUT BEHAVIORAL DISTURBANCE: ICD-10-CM

## 2022-03-14 DIAGNOSIS — Z51.5 ENCOUNTER FOR PALLIATIVE CARE: ICD-10-CM

## 2022-03-14 DIAGNOSIS — R41.82 ALTERED MENTAL STATUS, UNSPECIFIED: ICD-10-CM

## 2022-03-14 DIAGNOSIS — E44.0 MODERATE PROTEIN-CALORIE MALNUTRITION: ICD-10-CM

## 2022-03-14 LAB
ANION GAP SERPL CALC-SCNC: 3 MMOL/L — LOW (ref 5–17)
BUN SERPL-MCNC: 13 MG/DL — SIGNIFICANT CHANGE UP (ref 7–23)
CALCIUM SERPL-MCNC: 8.8 MG/DL — SIGNIFICANT CHANGE UP (ref 8.5–10.1)
CHLORIDE SERPL-SCNC: 118 MMOL/L — HIGH (ref 96–108)
CO2 SERPL-SCNC: 27 MMOL/L — SIGNIFICANT CHANGE UP (ref 22–31)
CREAT SERPL-MCNC: 0.41 MG/DL — LOW (ref 0.5–1.3)
EGFR: 99 ML/MIN/1.73M2 — SIGNIFICANT CHANGE UP
GLUCOSE BLDC GLUCOMTR-MCNC: 117 MG/DL — HIGH (ref 70–99)
GLUCOSE BLDC GLUCOMTR-MCNC: 141 MG/DL — HIGH (ref 70–99)
GLUCOSE BLDC GLUCOMTR-MCNC: 150 MG/DL — HIGH (ref 70–99)
GLUCOSE BLDC GLUCOMTR-MCNC: 151 MG/DL — HIGH (ref 70–99)
GLUCOSE BLDC GLUCOMTR-MCNC: 162 MG/DL — HIGH (ref 70–99)
GLUCOSE SERPL-MCNC: 157 MG/DL — HIGH (ref 70–99)
POTASSIUM SERPL-MCNC: 3.4 MMOL/L — LOW (ref 3.5–5.3)
POTASSIUM SERPL-SCNC: 3.4 MMOL/L — LOW (ref 3.5–5.3)
SODIUM SERPL-SCNC: 148 MMOL/L — HIGH (ref 135–145)

## 2022-03-14 PROCEDURE — 99223 1ST HOSP IP/OBS HIGH 75: CPT

## 2022-03-14 PROCEDURE — 99233 SBSQ HOSP IP/OBS HIGH 50: CPT

## 2022-03-14 RX ORDER — INSULIN LISPRO 100/ML
VIAL (ML) SUBCUTANEOUS
Refills: 0 | Status: DISCONTINUED | OUTPATIENT
Start: 2022-03-14 | End: 2022-03-22

## 2022-03-14 RX ORDER — INSULIN LISPRO 100/ML
VIAL (ML) SUBCUTANEOUS AT BEDTIME
Refills: 0 | Status: DISCONTINUED | OUTPATIENT
Start: 2022-03-14 | End: 2022-03-22

## 2022-03-14 RX ORDER — DILTIAZEM HCL 120 MG
10 CAPSULE, EXT RELEASE 24 HR ORAL ONCE
Refills: 0 | Status: COMPLETED | OUTPATIENT
Start: 2022-03-14 | End: 2022-03-14

## 2022-03-14 RX ORDER — SODIUM CHLORIDE 9 MG/ML
1000 INJECTION INTRAMUSCULAR; INTRAVENOUS; SUBCUTANEOUS
Refills: 0 | Status: DISCONTINUED | OUTPATIENT
Start: 2022-03-14 | End: 2022-03-16

## 2022-03-14 RX ORDER — CEFTRIAXONE 500 MG/1
1000 INJECTION, POWDER, FOR SOLUTION INTRAMUSCULAR; INTRAVENOUS EVERY 24 HOURS
Refills: 0 | Status: COMPLETED | OUTPATIENT
Start: 2022-03-14 | End: 2022-03-16

## 2022-03-14 RX ADMIN — NYSTATIN CREAM 1 APPLICATION(S): 100000 CREAM TOPICAL at 22:37

## 2022-03-14 RX ADMIN — Medication 10 MILLIGRAM(S): at 16:13

## 2022-03-14 RX ADMIN — SODIUM CHLORIDE 70 MILLILITER(S): 9 INJECTION, SOLUTION INTRAVENOUS at 04:14

## 2022-03-14 RX ADMIN — CEFTRIAXONE 100 MILLIGRAM(S): 500 INJECTION, POWDER, FOR SOLUTION INTRAMUSCULAR; INTRAVENOUS at 16:13

## 2022-03-14 RX ADMIN — SODIUM CHLORIDE 75 MILLILITER(S): 9 INJECTION INTRAMUSCULAR; INTRAVENOUS; SUBCUTANEOUS at 16:19

## 2022-03-14 RX ADMIN — NYSTATIN CREAM 1 APPLICATION(S): 100000 CREAM TOPICAL at 05:15

## 2022-03-14 RX ADMIN — NYSTATIN CREAM 1 APPLICATION(S): 100000 CREAM TOPICAL at 14:25

## 2022-03-14 RX ADMIN — Medication 1: at 11:33

## 2022-03-14 RX ADMIN — HEPARIN SODIUM 5000 UNIT(S): 5000 INJECTION INTRAVENOUS; SUBCUTANEOUS at 05:15

## 2022-03-14 RX ADMIN — HEPARIN SODIUM 5000 UNIT(S): 5000 INJECTION INTRAVENOUS; SUBCUTANEOUS at 17:53

## 2022-03-14 NOTE — SWALLOW BEDSIDE ASSESSMENT ADULT - PHARYNGEAL PHASE
Delayed pharyngeal swallow/Decreased laryngeal elevation Delayed pharyngeal swallow/Decreased laryngeal elevation/Wet vocal quality post oral intake

## 2022-03-14 NOTE — DIETITIAN INITIAL EVALUATION ADULT. - PERTINENT MEDS FT
MEDICATIONS  (STANDING):  dextrose 40% Gel 15 Gram(s) Oral once  dextrose 5% + sodium chloride 0.45%. 1000 milliLiter(s) (70 mL/Hr) IV Continuous <Continuous>  dextrose 5%. 1000 milliLiter(s) (50 mL/Hr) IV Continuous <Continuous>  dextrose 5%. 1000 milliLiter(s) (100 mL/Hr) IV Continuous <Continuous>  dextrose 50% Injectable 25 Gram(s) IV Push once  dextrose 50% Injectable 12.5 Gram(s) IV Push once  dextrose 50% Injectable 25 Gram(s) IV Push once  glucagon  Injectable 1 milliGRAM(s) IntraMuscular once  heparin   Injectable 5000 Unit(s) SubCutaneous every 12 hours  influenza  Vaccine (HIGH DOSE) 0.7 milliLiter(s) IntraMuscular once  insulin lispro (ADMELOG) corrective regimen sliding scale   SubCutaneous every 6 hours  nystatin Powder 1 Application(s) Topical every 8 hours    MEDICATIONS  (PRN):

## 2022-03-14 NOTE — PHYSICAL THERAPY INITIAL EVALUATION ADULT - GAIT DEVIATIONS NOTED, PT EVAL
decreased nichole/decreased velocity of limb motion/decreased step length/decreased stride length/increased stride width/decreased weight-shifting ability

## 2022-03-14 NOTE — CONSULT NOTE ADULT - PROBLEM SELECTOR RECOMMENDATION 7
albumin 2.8  hypernatremia-likely from decreased po intake-on 0.9% NS @75mL/ hour   temporal and clavicular wasting  catabolic state  passed speech and swallow eval-modified diet ordered

## 2022-03-14 NOTE — DIETITIAN INITIAL EVALUATION ADULT. - PERTINENT LABORATORY DATA
03-13 Na153 mmol/L<H> Glu 168 mg/dL<H> K+ 4.0 mmol/L Cr  0.58 mg/dL BUN 29 mg/dL<H> 03-12 Alb 2.8 g/dL<L>03-12  U/L<H> AST 61 U/L<H> Alkaline Phosphatase 79 U/L  03-13-22 @ 11:04 a1c 7.9<H>

## 2022-03-14 NOTE — SWALLOW BEDSIDE ASSESSMENT ADULT - SLP GENERAL OBSERVATIONS
pt seen bedside, alert and oriented to self. pt verbalizing pleasantly confused most tangential utterances, and she did not follow one step directions or model for oral [facility] exam. speech intelligibility fair with imprecise articulation

## 2022-03-14 NOTE — PHYSICAL THERAPY INITIAL EVALUATION ADULT - TRANSFER TRAINING, PT EVAL
Supervision and set up in  transfer ability bed to chair and vice versa using appropriate assistive device  and prevent falls.

## 2022-03-14 NOTE — SWALLOW BEDSIDE ASSESSMENT ADULT - ORAL PREPARATORY PHASE
fair oral opening for po trial/Refuses to accept bolus into oral cavity/Reduced oral grading Refuses to accept bolus into oral cavity Refuses to accept bolus into oral cavity/Reduced oral grading Refuses to accept bolus into oral cavity/Anterior loss of bolus

## 2022-03-14 NOTE — CONSULT NOTE ADULT - PROBLEM SELECTOR RECOMMENDATION 3
Patient not acting like herself per spouse, unclear if she is back to her baseline-left message for , Vincent  based on acute change, likely in setting of infection  CT head negative for acute findings  supportive care

## 2022-03-14 NOTE — CONSULT NOTE ADULT - SUBJECTIVE AND OBJECTIVE BOX
CARDIOLOGY CONSULTATION NOTE                                                                               BRITTNEY ARENAS is a 81y Female   HPI:  81 year old female PMH DM, HLD, advanced dementia presents today frannie accompanied with her  who states that she has not been herself since yesterday at 1pm, yesterday he first noticed that pt was not able to eat even with her dentures in. Also appeared SOB and moaning as if she were in pain, but when he would ask her if she was in pain, pt would respond no. Since waking up at 6 am today he feels that she is not speaking and unable to understand what she is saying. Patient appears in pain when he moves her in bed and reports that she has bed sores which he has been applying A & D ointment to.  (12 Mar 2022 14:40)      REVIEW OF SYSTEMS:NA  -----------------------------  Home Medications:      MEDICATIONS  (STANDING):    heparin   Injectable 5000 Unit(s) SubCutaneous every 12 hours  nystatin Powder 1 Application(s) Topical every 8 hours      ALLERGIES: No Known Allergies      FAMILY HISTORY:      PHYSICAL EXAMINATION:  -----------------------------  T(C): 36.8 (03-14-22 @ 10:50), Max: 36.8 (03-14-22 @ 10:50)  HR: 69 (03-14-22 @ 10:50) (65 - 82)  BP: 126/68 (03-14-22 @ 10:50) (114/74 - 126/68)  RR: 18 (03-14-22 @ 10:50) (17 - 18)  SpO2: 100% (03-14-22 @ 10:50) (98% - 100%)  Wt(kg): --    03-13 @ 07:01  -  03-14 @ 07:00  --------------------------------------------------------  IN:    dextrose 5% + sodium chloride 0.45%: 1000 mL  Total IN: 1000 mL    OUT:    Voided (mL): 100 mL  Total OUT: 100 mL    Total NET: 900 mL            Constitutional: well developed, normal appearance, well groomed, well nourished, no deformities and no acute distress.   Eyes: the conjunctiva exhibited no abnormalities and the eyelids demonstrated no xanthelasmas.   HEENT: normal oral mucosa, no oral pallor and no oral cyanosis.   Neck: normal jugular venous A waves present, normal jugular venous V waves present and no jugular venous sim A waves.   Pulmonary: no respiratory distress, normal respiratory rhythm and effort, no accessory muscle use and lungs were clear to auscultation bilaterally.   Cardiovascular: heart rate and rhythm were normal, normal S1 and S2 and no murmur, gallop, rub, heave or thrill are present.   Abdomen: soft, non-tender, no hepato-splenomegaly and no abdominal mass palpated.   Musculoskeletal: the gait could not be assessed..   Extremities: no clubbing of the fingernails, no localized cyanosis, no petechial hemorrhages and no ischemic changes.   Skin: normal skin color and pigmentation, no rash, no venous stasis, no skin lesions, no skin ulcer and no xanthoma was observed.   Psychiatric: oriented to person, place, and time, the affect was normal, the mood was normal and not feeling anxious.     ECG:  -------        LABS:   --------  03-13    153<H>  |  122<H>  |  29<H>  ----------------------------<  168<H>  4.0   |  29  |  0.58    Ca    9.4      13 Mar 2022 11:41  Mg     2.2     03-13               Culture Results:   >100,000 CFU/ml Gram Negative Rods (03-12-22 @ 18:42)      RADIOLOGY REPORTS:  -----------------------------        ECHOCARDIOGRAM:  ---------------------------         CARDIOLOGY CONSULTATION NOTE                                                                             BRITTNEY ARENAS is a 81y AA Female with known h/o DM2, HLD, advanced dementia.  BIBA for altered mental status.  As per ED notes from  - she has not been herself since yesterday at 1pm, not able to eat and appearing to be dyspneic. Since waking up early in the morning, he felt that she was not able to speak and unable to understand what she is saying. Patient appears in pain when he moves her in bed and reports that she has bed sores which he has been applying A & D ointment to.  (12 Mar 2022 14:40)   REVIEW OF SYSTEMS:NA -----------------------------   Home Medications: Unknown   MEDICATIONS  (STANDING):  heparin   Injectable 5000 Unit(s) SubCutaneous every 12 hours nystatin Powder 1 Application(s) Topical every 8 hours   ALLERGIES: No Known Allergies   FAMILY HISTORY:   PHYSICAL EXAMINATION: ----------------------------- T(C): 36.8 (03-14-22 @ 10:50), Max: 36.8 (03-14-22 @ 10:50) HR: 69 (03-14-22 @ 10:50) (65 - 82) BP: 126/68 (03-14-22 @ 10:50) (114/74 - 126/68) RR: 18 (03-14-22 @ 10:50) (17 - 18) SpO2: 100% (03-14-22 @ 10:50) (98% - 100%) Wt(kg): --  03-13 @ 07:01  -  03-14 @ 07:00 -------------------------------------------------------- IN:   dextrose 5% + sodium chloride 0.45%: 1000 mL Total IN: 1000 mL  OUT:   Voided (mL): 100 mL Total OUT: 100 mL  Total NET: 900 mL   Constitutional: cachectic, no deformities and no acute distress.  Eyes: the conjunctiva exhibited no abnormalities and the eyelids demonstrated no xanthelasmas.  HEENT: normal oral mucosa, no oral pallor and no oral cyanosis.  Neck: normal jugular venous A waves present, normal jugular venous V waves present and no jugular venous sim A waves.  Pulmonary: no respiratory distress, normal respiratory rhythm and effort, no accessory muscle use and lungs were clear to auscultation bilaterally.  Cardiovascular: heart rate and rhythm were normal, normal S1 and S2 and no murmur, gallop, rub, heave or thrill are present.  Abdomen: soft, non-tender, no hepato-splenomegaly and no abdominal mass palpated.  Musculoskeletal: the gait could not be assessed..  Extremities: no clubbing of the fingernails, no localized cyanosis, no petechial hemorrhages and no ischemic changes.  Skin: normal skin color and pigmentation, no rash, no venous stasis, +sacral ulceration.  Psychiatric: oriented to person, place, and time, the affect was normal, the mood was normal and not feeling anxious.   ECG: ------- < from: 12 Lead ECG (10.12.21 @ 22:56) >  Ventricular Rate 95 BPM  Atrial Rate 95 BPM  P-R Interval 152 ms  QRS Duration 112 ms  Q-T Interval 406 ms  QTC Calculation(Bazett) 510 ms  R Axis -75 degrees  T Axis 42 degrees  Diagnosis Line Sinus rhythm with Premature atrial complexes Incomplete right bundle branch block Left anterior fascicular block Prolonged QT Abnormal ECG  < end of copied text >    LABS:  -------- 03-13  153<H>  |  122<H>  |  29<H> ----------------------------<  168<H> 4.0   |  29  |  0.58  Ca    9.4      13 Mar 2022 11:41 Mg     2.2     03-13         Culture Results:  >100,000 CFU/ml Gram Negative Rods (03-12-22 @ 18:42)   RADIOLOGY REPORTS: ----------------------------- < from: Xray Chest 1 View- PORTABLE-Urgent (Xray Chest 1 View- PORTABLE-Urgent .) (03.12.22 @ 11:03) >  ACC: [id number] EXAM:  XR CHEST PORTABLE URGENT 1V                        PROCEDURE DATE:  03/12/2022      INTERPRETATION:  Exam:XR CHEST URGENT  clinical history:Altered mental status  Heart size is normal. Lungs show no acute infiltrate. No pleural effusion.  IMPRESSION: No active parenchymal disease in the chest.    --- End of Report ---      DECLAN TOBIAS MD; Attending Radiologist This document has been electronically signed. Mar 12 2022  1:29PM  < end of copied text >    ECHOCARDIOGRAM: ---------------------------

## 2022-03-14 NOTE — CONSULT NOTE ADULT - PROBLEM SELECTOR RECOMMENDATION 9
UA with moderate leukocytes and urine culture with >100,000 CFU/ml Proteus mirabilis  on ceftriaxone

## 2022-03-14 NOTE — CONSULT NOTE ADULT - PROBLEM SELECTOR RECOMMENDATION 5
unclear if patient is back to baseline  at present, alert and oriented to self only  answers simple questions but also gives inappropriate responses to questions

## 2022-03-14 NOTE — PROGRESS NOTE ADULT - ASSESSMENT
81 year old female PMH DM, HLD, advanced dementia presents today frannie accompanied with her  who states that she has not been herself since yesterday at 1pm, yesterday he first noticed that pt was not able to eat even with her dentures in. Also appeared SOB and moaning as if she were in pain, but when he would ask her if she was in pain, pt would respond no. Since waking up at 6 am today he feels that she is not speaking and unable to understand what she is saying. Patient appears in pain when he moves her in bed and reports that she has bed sores which he has been applying A & D ointment to.     Plan: Admit to medicine for dehydration and UTI. IVF now with /h. Sodium high at 151 but will avoid D5W given high BS at 319. Continue Ceftriaxone   1 gram daily, follow urine culture. UA appears infected with WBC > 50. Follow urine culture.  Speech and Swallow eval ordered to assign   proper diet, needs dysphagia diet. CXR is clear, CT head no acute findings.  Trop high on arrival at 82, likely false elevation    Metabolic encephalopathy 2/2 UTI  Lactate elevated  LR bolus --> C/W D5+1/2 NS @ 75cc/hr  NPO  Speech swallow study  Pt receieved ceftriaxone X1  F/U urine cultures    Demand ischemia 2/2 dehydration  troponins trending down  not reporting CP  F/U EKG    Dehydration  LR bolus    Advanced dementia  Palliative care consult    Hyperglycemia 2/2 uncontrolle diabetes  Received fluid bolus  HgA1c obtained  NPO, ISS Q6, FS Q6  Glucose 319 on arrival, SSC for now whille NPO.     HyperNa+  2/2 dehydration/hyperglycemia    DVT PPX  heparin    Wound care ordered for sacral decub found on arrival    #Called all numbers in chart including emergency contact. No answer with spouse number incorrect. Female picked up on the other line stated its not the family of Mrs. Finch. Will need assistance to clarify code status and medications. Currently full code.  81 year old female PMH DM, HLD, advanced dementia presents today frannie accompanied with her  who states that she has not been herself since yesterday at 1pm, yesterday he first noticed that pt was not able to eat even with her dentures in. Also appeared SOB and moaning as if she were in pain, but when he would ask her if she was in pain, pt would respond no. Since waking up at 6 am today he feels that she is not speaking and unable to understand what she is saying. Patient appears in pain when he moves her in bed and reports that she has bed sores which he has been applying A & D ointment to.     Plan: Admit to medicine for dehydration and UTI. IVF now with /h. Sodium high at 151 but will avoid D5W given high BS at 319. Continue Ceftriaxone   1 gram daily, follow urine culture. UA appears infected with WBC > 50. Follow urine culture.  Speech and Swallow eval ordered to assign   proper diet, needs dysphagia diet. CXR is clear, CT head no acute findings.  Trop high on arrival at 82, likely false elevation    Metabolic encephalopathy 2/2 UTI  Lactate elevated  LR bolus --> C/W D5+1/2 NS @ 75cc/hr  NPO  Speech swallow study  Pt receieved ceftriaxone X1  F/U urine cultures    ?afib on telemetry, Demand ischemia 2/2 dehydration  troponins trending down  not reporting CP  Cardio eval  F/U EKG    Dehydration  LR bolus    Advanced dementia  Palliative care consult    Hyperglycemia 2/2 uncontrolle diabetes  Received fluid bolus  HgA1c obtained  NPO, ISS Q6, FS Q6  Glucose 319 on arrival, SSC for now whille NPO.     HyperNa+  2/2 dehydration/hyperglycemia    DVT PPX  heparin    Wound care ordered for sacral decub found on arrival    #Called all numbers in chart including emergency contact. No answer with spouse number incorrect. Female picked up on the other line stated its not the family of Mrs. Finch. Will need assistance to clarify code status and medications. Currently full code.

## 2022-03-14 NOTE — PHYSICAL THERAPY INITIAL EVALUATION ADULT - BALANCE TRAINING, PT EVAL
Improve sitting, transfers, standing and ambulation with fair to good balance using appropriate assistive device and prevent falls.

## 2022-03-14 NOTE — DISCHARGE NOTE NURSING/CASE MANAGEMENT/SOCIAL WORK - PATIENT PORTAL LINK FT
You can access the FollowMyHealth Patient Portal offered by [facility] by registering at the following website: [email or web address]. By joining [facility]’s FollowMyHealth portal, you will also be able to view your health information using other applications (apps) compatible with our system.

## 2022-03-14 NOTE — CONSULT NOTE ADULT - ATTENDING COMMENTS
Pt seen and examined with NP Junior, agree with above assessment and plan- pt with advanced dementia and acute delirium likely related to UTI- being treated with ABx and fluids. Unable to reach . Would need to determine goals with  and/or NOK available. Due to poor condition and advanced dementia pt would be poor candidate for advanced life support treatments as it would not change outcome and could produce suffering. DNR would be appropriate, awaiting call back from .  D/w Dr. Becerril

## 2022-03-14 NOTE — DIETITIAN INITIAL EVALUATION ADULT. - ORAL INTAKE PTA/DIET HISTORY
As per chart review, pt c AMS, was not herself and unable to eat PTA.  Pt lived c spouse PTA.    RD attempted to call spouse via phone # [phone] in chart, phone c busy signal.  As per  and , multiple phone numbers tried without success reaching family at present.

## 2022-03-14 NOTE — PROGRESS NOTE ADULT - SUBJECTIVE AND OBJECTIVE BOX
Patient is a 81y old  Female who presents with a chief complaint of Lethargy at home with decreased PO intake. (14 Mar 2022 12:07)    INTERVAL HPI/OVERNIGHT EVENTS: Patients seen and examined at bedside this morning. No acute events overnight. Pt requesting water. Reports not in pain.     MEDICATIONS  (STANDING):  cefTRIAXone   IVPB 1000 milliGRAM(s) IV Intermittent every 24 hours  dextrose 40% Gel 15 Gram(s) Oral once  dextrose 5%. 1000 milliLiter(s) (50 mL/Hr) IV Continuous <Continuous>  dextrose 5%. 1000 milliLiter(s) (100 mL/Hr) IV Continuous <Continuous>  dextrose 50% Injectable 25 Gram(s) IV Push once  dextrose 50% Injectable 12.5 Gram(s) IV Push once  dextrose 50% Injectable 25 Gram(s) IV Push once  diltiazem Injectable 10 milliGRAM(s) IV Push once  glucagon  Injectable 1 milliGRAM(s) IntraMuscular once  heparin   Injectable 5000 Unit(s) SubCutaneous every 12 hours  influenza  Vaccine (HIGH DOSE) 0.7 milliLiter(s) IntraMuscular once  insulin lispro (ADMELOG) corrective regimen sliding scale   SubCutaneous every 6 hours  nystatin Powder 1 Application(s) Topical every 8 hours    MEDICATIONS  (PRN):    Allergies    No Known Allergies    Intolerances      REVIEW OF SYSTEMS:  All other systems reviewed and are negative    Vital Signs Last 24 Hrs  T(C): 36.8 (14 Mar 2022 10:50), Max: 36.8 (14 Mar 2022 10:50)  T(F): 98.2 (14 Mar 2022 10:50), Max: 98.2 (14 Mar 2022 10:50)  HR: 69 (14 Mar 2022 10:50) (65 - 82)  BP: 126/68 (14 Mar 2022 10:50) (114/74 - 126/68)  BP(mean): --  RR: 18 (14 Mar 2022 10:50) (17 - 18)  SpO2: 100% (14 Mar 2022 10:50) (98% - 100%)  Daily     Daily Weight in k.1 (14 Mar 2022 05:17)  I&O's Summary    13 Mar 2022 07:01  -  14 Mar 2022 07:00  --------------------------------------------------------  IN: 1000 mL / OUT: 100 mL / NET: 900 mL      CAPILLARY BLOOD GLUCOSE      POCT Blood Glucose.: 151 mg/dL (14 Mar 2022 11:27)  POCT Blood Glucose.: 150 mg/dL (14 Mar 2022 07:47)  POCT Blood Glucose.: 141 mg/dL (14 Mar 2022 07:17)  POCT Blood Glucose.: 138 mg/dL (13 Mar 2022 23:31)  POCT Blood Glucose.: 164 mg/dL (13 Mar 2022 20:59)  POCT Blood Glucose.: 124 mg/dL (13 Mar 2022 16:01)    PHYSICAL EXAM:  GENERAL: NAD, chronically ill appearing  HEAD:  Atraumatic, Normocephalic  EYES: EOMI, PERRLA, conjunctiva and sclera clear  ENMT: No tonsillar erythema, exudates, or enlargement; Moist mucous membranes, Poor dentition, No lesions  NECK: Supple, No JVD, Normal thyroid  NERVOUS SYSTEM:  Alert & Oriented X1, Poor concentration; Motor Strength 3/5 B/L upper and lower extremities; DTRs 2+ intact and symmetric  CHEST/LUNG: Clear to percussion bilaterally; No rales, rhonchi, wheezing, or rubs  HEART: Regular rate and rhythm; No murmurs, rubs, or gallops  ABDOMEN: Soft, Nontender, Nondistended; Bowel sounds present  EXTREMITIES:  2+ Peripheral Pulses, No clubbing, cyanosis, or edema  LYMPH: No lymphadenopathy noted  SKIN: No rashes or lesions    Labs          153<H>  |  122<H>  |  29<H>  ----------------------------<  168<H>  4.0   |  29  |  0.58    Ca    9.4      13 Mar 2022 11:41  Mg     2.2     03-13                Culture - Urine (collected 12 Mar 2022 18:42)  Source: Clean Catch Clean Catch (Midstream)  Preliminary Report (14 Mar 2022 12:43):    >100,000 CFU/ml Proteus mirabilis

## 2022-03-14 NOTE — CONSULT NOTE ADULT - PROBLEM SELECTOR RECOMMENDATION 8
Called patient's , Vincent Finch [phone] and voicemail left.  Team has been unable to reach patient's  and no other numbers found on chart review.  Patient is full code with history of advanced dementia and poor functional ability; however, physicians and medical providers are not obligated to provide interventions such as ACLS that would not likely improve patient's outcome and cause suffering.  Will continue to follow to obtain GOC from family.    Discussed with Dr. Becerril and social work

## 2022-03-14 NOTE — PHYSICAL THERAPY INITIAL EVALUATION ADULT - STRENGTHENING, PT EVAL
Improve strength in the UE and LE to 4+/5 and be able to perform functional tasks-bed mobility, sitting, standing, transfers and ambulate in a safe manner c CGA/supervision and set-up using   assistive device and prevent falls.

## 2022-03-14 NOTE — CONSULT NOTE ADULT - ASSESSMENT
81 year old female PMH of diabetes and dementia presents to ED for AMS.  Patient with UTI on abx.  Palliative care consulted for GOC.

## 2022-03-14 NOTE — CONSULT NOTE ADULT - PROBLEM SELECTOR RECOMMENDATION 2
noted to be in afib today previously in NSR  on tele   Echo pending   cardiology consult appreciated

## 2022-03-14 NOTE — SWALLOW BEDSIDE ASSESSMENT ADULT - SWALLOW EVAL: DIAGNOSIS
oropharyngeal phases of swallow marked by decreased cognition, fair po acceptance, increased oral transport times with oral holding and munching across textures, suspect delay in swallow trigger with adequate hyolaryngeal excursion/elevation. intermittent clinical signs of laryngeal penetration/aspiration with thin liquid

## 2022-03-14 NOTE — CONSULT NOTE ADULT - SUBJECTIVE AND OBJECTIVE BOX
HPI:  81 year old female PMH DM, HLD, advanced dementia presents today frannie accompanied with her  who states that she has not been herself since yesterday at 1pm, yesterday he first noticed that pt was not able to eat even with her dentures in. Also appeared SOB and moaning as if she were in pain, but when he would ask her if she was in pain, pt would respond no. Since waking up at 6 am today he feels that she is not speaking and unable to understand what she is saying. Patient appears in pain when he moves her in bed and reports that she has bed sores which he has been applying A & D ointment to.  (12 Mar 2022 14:40)    PERTINENT PM/SXH:   DM (diabetes mellitus)      No significant past surgical history      FAMILY HISTORY:    ITEMS NOT CHECKED ARE NOT PRESENT    SOCIAL HISTORY:   Significant other/partner: yes  [ ]  Children:  [ ]  Jewish/Spirituality: Buddhist  Substance hx:  [ ]   Tobacco hx:  [ ]   Alcohol hx: [ ]   Home Opioid hx:  [ ] I-Stop Reference No: Reference #: [id number]  Living Situation: [ ]Home  [ ]Long term care  [ ]Rehab [ ]Other    ADVANCE DIRECTIVES:    DNR  MOLST  [ ]  Living Will  [ ]   DECISION MAKER(s):  [ ] Health Care Proxy(s)  [x ] Surrogate(s)  [ ] Guardian           Name(s): Phone Number(s): Vincent Finch [phone]    BASELINE (I)ADL(s) (prior to admission):  [place]: [ ]Total  [ ] Moderate [ ]Dependent    Allergies    No Known Allergies    Intolerances    MEDICATIONS  (STANDING):  dextrose 40% Gel 15 Gram(s) Oral once  dextrose 5%. 1000 milliLiter(s) (50 mL/Hr) IV Continuous <Continuous>  dextrose 5%. 1000 milliLiter(s) (100 mL/Hr) IV Continuous <Continuous>  dextrose 50% Injectable 25 Gram(s) IV Push once  dextrose 50% Injectable 12.5 Gram(s) IV Push once  dextrose 50% Injectable 25 Gram(s) IV Push once  diltiazem Injectable 10 milliGRAM(s) IV Push once  glucagon  Injectable 1 milliGRAM(s) IntraMuscular once  heparin   Injectable 5000 Unit(s) SubCutaneous every 12 hours  influenza  Vaccine (HIGH DOSE) 0.7 milliLiter(s) IntraMuscular once  insulin lispro (ADMELOG) corrective regimen sliding scale   SubCutaneous every 6 hours  nystatin Powder 1 Application(s) Topical every 8 hours    MEDICATIONS  (PRN):    PRESENT SYMPTOMS: [ x]Unable to obtain due to poor mentation   Source if other than patient:  [ ]Family   [ ]Team     Pain: [ ] yes [ ] no  QOL impact -   Location -                    Aggravating factors -  Quality -  Radiation -  Timing-  Severity (0-10 scale):  Minimal acceptable level (0-10 scale):     PAIN AD Score: 0    [email or web address] (press ctrl +  left click to view)    Dyspnea:                           [ ]Mild [ ]Moderate [ ]Severe  Anxiety:                             [ ]Mild [ ]Moderate [ ]Severe  Fatigue:                             [ ]Mild [ ]Moderate [ ]Severe  Nausea:                             [ ]Mild [ ]Moderate [ ]Severe  Loss of appetite:              [ ]Mild [ ]Moderate [ ]Severe  Constipation:                    [ ]Mild [ ]Moderate [ ]Severe    Other Symptoms:  [ ]All other review of systems negative     Karnofsky Performance Score/Palliative Performance Status Version 2:       20-30  %    http://npcrc.org/files/news/palliative_performance_scale_ppsv2.pdf  PHYSICAL EXAM:  Vital Signs Last 24 Hrs  T(C): 36.8 (14 Mar 2022 10:50), Max: 36.8 (14 Mar 2022 10:50)  T(F): 98.2 (14 Mar 2022 10:50), Max: 98.2 (14 Mar 2022 10:50)  HR: 69 (14 Mar 2022 10:50) (65 - 82)  BP: 126/68 (14 Mar 2022 10:50) (114/74 - 126/68)  BP(mean): --  RR: 18 (14 Mar 2022 10:50) (17 - 18)  SpO2: 100% (14 Mar 2022 10:50) (98% - 100%) I&O's Summary    13 Mar 2022 07:01  -  14 Mar 2022 07:00  --------------------------------------------------------  IN: 1000 mL / OUT: 100 mL / NET: 900 mL    GENERAL:  [ x]Alert  [ x]Oriented x 1  [ ]Lethargic  [ ]Cachexia  [ ]Unarousable  [ x]Verbal  [ ]Non-Verbal  Behavioral:   [ ] Anxiety  [ ] Delirium [ ] Agitation [ ] Other  HEENT:  [ ]Normal   [x ]Dry mouth   [ ]ET Tube/Trach  [ ]Oral lesions  PULMONARY:   [ ]Clear [ ]Tachypnea  [ ]Audible excessive secretions   [ ]Rhonchi        [ ]Right [ ]Left [ ]Bilateral  [ ]Crackles        [ ]Right [ ]Left [ ]Bilateral  [ ]Wheezing     [ ]Right [ ]Left [ ]Bilateral  diminished breath sounds bilaterally  CARDIOVASCULAR:    [x ]Regular [ ]Irregular [ ]Tachy  [ ]Yunior [ ]Murmur [ ]Other  GASTROINTESTINAL:  [x ]Soft  [ ]Distended   [ ]+BS  [ ]Non tender [ ]Tender  [ ]PEG [ ]OGT/ NGT  Last BM: 3/13/22 GENITOURINARY:  [ ]Normal [ x] Incontinent   [ ]Oliguria/Anuria   [ ]Gaspar  MUSCULOSKELETAL:   [ ]Normal   [ ]Weakness  [ x]Bed/Wheelchair bound [ ]Edema  NEUROLOGIC:   [ ]No focal deficits  [x] Cognitive impairment  [ ] Dysphagia [ ]Dysarthria [ ] Paresis [ ]Other   SKIN:   [ ]Normal   [x ]Pressure ulcer(s) sacrum stage II, cocyx stage II, Left heel DTI [ ]Rash    CRITICAL CARE:  [ ] Shock Present  [ ]Septic [ ]Cardiogenic [ ]Neurologic [ ]Hypovolemic  [ ]  Vasopressors [ ]  Inotropes   [ ] Respiratory failure present [ ] mechanical ventilation [ ] non-invasive ventilatory support [ ] High flow  [ ] Acute  [ ] Chronic [ ] Hypoxic  [ ] Hypercarbic [ ] Other  [ ] Other organ failure     LABS:  03-13    153<H>  |  122<H>  |  29<H>  ----------------------------<  168<H>  4.0   |  29  |  0.58    Ca    9.4      13 Mar 2022 11:41  Mg     2.2     03-13    Culture - Urine (03.12.22 @ 18:42)    Specimen Source: Clean Catch Clean Catch (Midstream)    Culture Results:   >100,000 CFU/ml Proteus mirabilis    Urinalysis (03.12.22 @ 13:06)    pH Urine: 7.0    Glucose Qualitative, Urine: 1000 mg/dL    Blood, Urine: Moderate    Color: Yellow    Urine Appearance: Slightly Turbid    Bilirubin: Negative    Ketone - Urine: Negative    Specific Gravity: 1.010    Protein, Urine: 100 mg/dL    Urobilinogen: 1 mg/dL    Nitrite: Negative    Leukocyte Esterase Concentration: Moderate    RADIOLOGY & ADDITIONAL STUDIES:   < from: CT Head No Cont (03.12.22 @ 11:55) >  ACC: [id number] EXAM:  CT BRAIN                        PROCEDURE DATE:  03/12/2022    INTERPRETATION:  Clinical indications: Altered mental status  Multiple axial sections were performed from the base of skull to vertex   without contrast enhancement.  This exam is compared prior head CT performed on October 13, 2020.  This exam somewhat limited by motion  Parenchymal volume loss and chronic microvascular ischemic changes are   identified.  There is no acute hemorrhage mass or mass effect seen.  Evaluation of the osseous structures with the appropriate window   demonstrates near complete opacification left sphenoid sinus   mucosal/air-fluid level with thickening of the surrounding bone. These   findings appear unchanged  Evaluation of the osseous structures with the appropriate window appears   unremarkable  IMPRESSION: No evidence of acute hemorrhage, mass or mass effect.  --- End of Report ---  < end of copied text >    < from: Xray Chest 1 View- PORTABLE-Urgent (Xray Chest 1 View- PORTABLE-Urgent .) (03.12.22 @ 11:03) >  ACC: [id number] EXAM:  XR CHEST PORTABLE URGENT 1V                        PROCEDURE DATE:  03/12/2022    INTERPRETATION:  Exam:XR CHEST URGENT  clinical history:Altered mental status  Heart size is normal. Lungs show no acute infiltrate. No pleural effusion.  IMPRESSION:  No active parenchymal disease in the chest.  --- End of Report ---  < end of copied text >    PROTEIN CALORIE MALNUTRITION PRESENT: [ x] Yes [ ] No  [x ] PPSV2 < or = to 30% [ ] significant weight loss  [x ] poor nutritional intake [ ] catabolic state [ ] anasarca     Artificial Nutrition [ ]     REFERRALS:   [ ]Chaplaincy  [ ] Hospice  [ ]Child Life  [ ]Social Work  [ ]Case management [ ]Holistic Therapy     Goals of Care Document:

## 2022-03-14 NOTE — SWALLOW BEDSIDE ASSESSMENT ADULT - H & P REVIEW
81 year old female PMH DM, HLD, advanced dementia presents today biba accompanied with her  who states that she has not been herself since yesterday at 1pm, yesterday he first noticed that pt was not able to eat even with her dentures in. Also appeared SOB and moaning as if she were in pain, but when he would ask her if she was in pain, pt would respond no. Since waking up at 6 am today he feels that she is not speaking and unable to understand what she is saying. Patient appears in pain when he moves her in bed and reports that she has bed sores which he has been applying A & D ointment to/yes

## 2022-03-14 NOTE — PHYSICAL THERAPY INITIAL EVALUATION ADULT - GAIT TRAINING, PT EVAL
Pt will be able to ambulate using assistive device up to 100  ft or more c CGA/supervision  be able to negotiate steps safely observing proper gait, posture and prevent falls.

## 2022-03-14 NOTE — DIETITIAN INITIAL EVALUATION ADULT. - FACTORS AFF FOOD INTAKE
03/14., swallow evaluation c recommendation for puree, mildly thick, spoke c NP who ordered diet @ present/change in mental status/difficulty swallowing/other (specify)

## 2022-03-14 NOTE — DIETITIAN NUTRITION RISK NOTIFICATION - TREATMENT: THE FOLLOWING DIET HAS BEEN RECOMMENDED
Diet, Pureed:   Consistent Carbohydrate {Evening Snack}  Mildly Thick Liquids (MILDTHICKLIQS)  Low Sodium  Supplement Feeding Modality:  Oral  Ensure Pudding Cans or Servings Per Day:  1       Frequency:  Three Times a day (03-14-22 @ 11:06) [Active]

## 2022-03-14 NOTE — PHYSICAL THERAPY INITIAL EVALUATION ADULT - IMPAIRMENTS FOUND, PT EVAL
aerobic capacity/endurance/arousal, attention, and cognition/cognitive impairment/ergonomics and body mechanics/gait, locomotion, and balance/muscle strength/poor safety awareness/posture

## 2022-03-14 NOTE — SWALLOW BEDSIDE ASSESSMENT ADULT - COMMENTS
CXR 3/12/2022IMPRESSION:No active parenchymal disease in the chest.    CT head no contrast 3/12/2022IMPRESSION: No evidence of acute hemorrhage, mass or mass effect.

## 2022-03-14 NOTE — CONSULT NOTE ADULT - ASSESSMENT
UTI  New AF    The chart has been reviewed but the patient has not yet been examined.  Full note to follow. 82 yo female admitted for altered mental status likely in the setting of acute UTI.  On IV antibiotics, as per medicine.  Appears fairly dehydrated clinically, getting IV repletion.  New "atrial fibrillation" appears to be sinus with APC's, RBBB+LAHB. Continue to monitor on telemetry for rhythm assessment. May be exacerbated due to dehydration aand hypernatremia, should improve with hydration.  Get TTE risk stratification for arrhythmia.  Not candidate for aggressive interventions due to baseline dementia/debility.  Home meds?

## 2022-03-14 NOTE — SWALLOW BEDSIDE ASSESSMENT ADULT - SWALLOW EVAL: RECOMMENDED FEEDING/EATING TECHNIQUES
crush medication (when feasible)/maintain upright posture during/after eating for 30 mins/oral hygiene/small sips/bites

## 2022-03-15 LAB
-  AMIKACIN: SIGNIFICANT CHANGE UP
-  AMOXICILLIN/CLAVULANIC ACID: SIGNIFICANT CHANGE UP
-  AMPICILLIN/SULBACTAM: SIGNIFICANT CHANGE UP
-  AMPICILLIN: SIGNIFICANT CHANGE UP
-  AZTREONAM: SIGNIFICANT CHANGE UP
-  CEFAZOLIN: SIGNIFICANT CHANGE UP
-  CEFEPIME: SIGNIFICANT CHANGE UP
-  CEFOXITIN: SIGNIFICANT CHANGE UP
-  CEFTRIAXONE: SIGNIFICANT CHANGE UP
-  CIPROFLOXACIN: SIGNIFICANT CHANGE UP
-  ERTAPENEM: SIGNIFICANT CHANGE UP
-  GENTAMICIN: SIGNIFICANT CHANGE UP
-  LEVOFLOXACIN: SIGNIFICANT CHANGE UP
-  MEROPENEM: SIGNIFICANT CHANGE UP
-  NITROFURANTOIN: SIGNIFICANT CHANGE UP
-  PIPERACILLIN/TAZOBACTAM: SIGNIFICANT CHANGE UP
-  TOBRAMYCIN: SIGNIFICANT CHANGE UP
-  TRIMETHOPRIM/SULFAMETHOXAZOLE: SIGNIFICANT CHANGE UP
ANION GAP SERPL CALC-SCNC: 5 MMOL/L — SIGNIFICANT CHANGE UP (ref 5–17)
BUN SERPL-MCNC: 10 MG/DL — SIGNIFICANT CHANGE UP (ref 7–23)
CALCIUM SERPL-MCNC: 8.5 MG/DL — SIGNIFICANT CHANGE UP (ref 8.5–10.1)
CHLORIDE SERPL-SCNC: 117 MMOL/L — HIGH (ref 96–108)
CO2 SERPL-SCNC: 26 MMOL/L — SIGNIFICANT CHANGE UP (ref 22–31)
CREAT SERPL-MCNC: 0.39 MG/DL — LOW (ref 0.5–1.3)
CULTURE RESULTS: SIGNIFICANT CHANGE UP
EGFR: 100 ML/MIN/1.73M2 — SIGNIFICANT CHANGE UP
GLUCOSE BLDC GLUCOMTR-MCNC: 166 MG/DL — HIGH (ref 70–99)
GLUCOSE BLDC GLUCOMTR-MCNC: 177 MG/DL — HIGH (ref 70–99)
GLUCOSE BLDC GLUCOMTR-MCNC: 214 MG/DL — HIGH (ref 70–99)
GLUCOSE BLDC GLUCOMTR-MCNC: 75 MG/DL — SIGNIFICANT CHANGE UP (ref 70–99)
GLUCOSE BLDC GLUCOMTR-MCNC: 77 MG/DL — SIGNIFICANT CHANGE UP (ref 70–99)
GLUCOSE BLDC GLUCOMTR-MCNC: 79 MG/DL — SIGNIFICANT CHANGE UP (ref 70–99)
GLUCOSE SERPL-MCNC: 82 MG/DL — SIGNIFICANT CHANGE UP (ref 70–99)
HCT VFR BLD CALC: 35.7 % — SIGNIFICANT CHANGE UP (ref 34.5–45)
HGB BLD-MCNC: 11.7 G/DL — SIGNIFICANT CHANGE UP (ref 11.5–15.5)
MAGNESIUM SERPL-MCNC: 1.9 MG/DL — SIGNIFICANT CHANGE UP (ref 1.6–2.6)
MCHC RBC-ENTMCNC: 29.5 PG — SIGNIFICANT CHANGE UP (ref 27–34)
MCHC RBC-ENTMCNC: 32.8 G/DL — SIGNIFICANT CHANGE UP (ref 32–36)
MCV RBC AUTO: 89.9 FL — SIGNIFICANT CHANGE UP (ref 80–100)
METHOD TYPE: SIGNIFICANT CHANGE UP
NRBC # BLD: 0 /100 WBCS — SIGNIFICANT CHANGE UP (ref 0–0)
ORGANISM # SPEC MICROSCOPIC CNT: SIGNIFICANT CHANGE UP
ORGANISM # SPEC MICROSCOPIC CNT: SIGNIFICANT CHANGE UP
PHOSPHATE SERPL-MCNC: 2 MG/DL — LOW (ref 2.5–4.5)
PLATELET # BLD AUTO: 166 K/UL — SIGNIFICANT CHANGE UP (ref 150–400)
POTASSIUM SERPL-MCNC: 3 MMOL/L — LOW (ref 3.5–5.3)
POTASSIUM SERPL-SCNC: 3 MMOL/L — LOW (ref 3.5–5.3)
RBC # BLD: 3.97 M/UL — SIGNIFICANT CHANGE UP (ref 3.8–5.2)
RBC # FLD: 14.7 % — HIGH (ref 10.3–14.5)
SODIUM SERPL-SCNC: 148 MMOL/L — HIGH (ref 135–145)
SPECIMEN SOURCE: SIGNIFICANT CHANGE UP
TSH SERPL-MCNC: 1.18 UU/ML — SIGNIFICANT CHANGE UP (ref 0.36–3.74)
WBC # BLD: 6.15 K/UL — SIGNIFICANT CHANGE UP (ref 3.8–10.5)
WBC # FLD AUTO: 6.15 K/UL — SIGNIFICANT CHANGE UP (ref 3.8–10.5)

## 2022-03-15 PROCEDURE — 93010 ELECTROCARDIOGRAM REPORT: CPT

## 2022-03-15 PROCEDURE — 93306 TTE W/DOPPLER COMPLETE: CPT | Mod: 26

## 2022-03-15 PROCEDURE — 99233 SBSQ HOSP IP/OBS HIGH 50: CPT

## 2022-03-15 RX ORDER — METOPROLOL TARTRATE 50 MG
12.5 TABLET ORAL
Refills: 0 | Status: DISCONTINUED | OUTPATIENT
Start: 2022-03-15 | End: 2022-03-17

## 2022-03-15 RX ORDER — DEXTROSE 50 % IN WATER 50 %
25 SYRINGE (ML) INTRAVENOUS ONCE
Refills: 0 | Status: COMPLETED | OUTPATIENT
Start: 2022-03-15 | End: 2022-03-15

## 2022-03-15 RX ORDER — ATORVASTATIN CALCIUM 80 MG/1
1 TABLET, FILM COATED ORAL
Qty: 0 | Refills: 0 | DISCHARGE

## 2022-03-15 RX ORDER — POTASSIUM CHLORIDE 20 MEQ
40 PACKET (EA) ORAL ONCE
Refills: 0 | Status: COMPLETED | OUTPATIENT
Start: 2022-03-15 | End: 2022-03-15

## 2022-03-15 RX ORDER — DILTIAZEM HCL 120 MG
10 CAPSULE, EXT RELEASE 24 HR ORAL ONCE
Refills: 0 | Status: COMPLETED | OUTPATIENT
Start: 2022-03-15 | End: 2022-03-15

## 2022-03-15 RX ORDER — SODIUM CHLORIDE 9 MG/ML
250 INJECTION INTRAMUSCULAR; INTRAVENOUS; SUBCUTANEOUS ONCE
Refills: 0 | Status: COMPLETED | OUTPATIENT
Start: 2022-03-15 | End: 2022-03-15

## 2022-03-15 RX ORDER — POTASSIUM CHLORIDE 20 MEQ
40 PACKET (EA) ORAL EVERY 4 HOURS
Refills: 0 | Status: COMPLETED | OUTPATIENT
Start: 2022-03-15 | End: 2022-03-15

## 2022-03-15 RX ORDER — METOPROLOL TARTRATE 50 MG
25 TABLET ORAL
Refills: 0 | Status: DISCONTINUED | OUTPATIENT
Start: 2022-03-15 | End: 2022-03-15

## 2022-03-15 RX ORDER — MAGNESIUM SULFATE 500 MG/ML
1 VIAL (ML) INJECTION ONCE
Refills: 0 | Status: COMPLETED | OUTPATIENT
Start: 2022-03-15 | End: 2022-03-15

## 2022-03-15 RX ORDER — SODIUM,POTASSIUM PHOSPHATES 278-250MG
1 POWDER IN PACKET (EA) ORAL THREE TIMES A DAY
Refills: 0 | Status: COMPLETED | OUTPATIENT
Start: 2022-03-15 | End: 2022-03-17

## 2022-03-15 RX ADMIN — Medication 40 MILLIEQUIVALENT(S): at 12:17

## 2022-03-15 RX ADMIN — NYSTATIN CREAM 1 APPLICATION(S): 100000 CREAM TOPICAL at 05:49

## 2022-03-15 RX ADMIN — SODIUM CHLORIDE 250 MILLILITER(S): 9 INJECTION INTRAMUSCULAR; INTRAVENOUS; SUBCUTANEOUS at 11:32

## 2022-03-15 RX ADMIN — HEPARIN SODIUM 5000 UNIT(S): 5000 INJECTION INTRAVENOUS; SUBCUTANEOUS at 17:17

## 2022-03-15 RX ADMIN — HEPARIN SODIUM 5000 UNIT(S): 5000 INJECTION INTRAVENOUS; SUBCUTANEOUS at 05:49

## 2022-03-15 RX ADMIN — Medication 25 MILLIGRAM(S): at 11:45

## 2022-03-15 RX ADMIN — Medication 100 GRAM(S): at 17:22

## 2022-03-15 RX ADMIN — Medication 40 MILLIEQUIVALENT(S): at 17:17

## 2022-03-15 RX ADMIN — Medication 2: at 11:45

## 2022-03-15 RX ADMIN — Medication 10 MILLIGRAM(S): at 11:17

## 2022-03-15 RX ADMIN — Medication 40 MILLIEQUIVALENT(S): at 14:38

## 2022-03-15 RX ADMIN — Medication 25 GRAM(S): at 21:15

## 2022-03-15 RX ADMIN — Medication 1 PACKET(S): at 22:40

## 2022-03-15 RX ADMIN — NYSTATIN CREAM 1 APPLICATION(S): 100000 CREAM TOPICAL at 14:38

## 2022-03-15 RX ADMIN — Medication 2: at 17:19

## 2022-03-15 RX ADMIN — NYSTATIN CREAM 1 APPLICATION(S): 100000 CREAM TOPICAL at 21:15

## 2022-03-15 RX ADMIN — Medication 12.5 MILLIGRAM(S): at 17:17

## 2022-03-15 RX ADMIN — Medication 40 MILLIEQUIVALENT(S): at 08:57

## 2022-03-15 RX ADMIN — CEFTRIAXONE 100 MILLIGRAM(S): 500 INJECTION, POWDER, FOR SOLUTION INTRAMUSCULAR; INTRAVENOUS at 14:38

## 2022-03-15 NOTE — PROGRESS NOTE ADULT - ASSESSMENT
80 yo female with hx of DM, HLD, dementia  admitted for altered mental status likely in the setting of acute UTI.  + UA, Culture - Urine (03.12.22 @ 18:42)>100,000 CFU/ml Proteus mirabilis  Appears fairly dehydrated clinically  New "atrial fibrillation" with RVR    -Continue to monitor on telemetry for rhythm assessment. May be exacerbated due to dehydration and hypernatremia, should improve with hydration.  -On IV antibiotics, as per medicine for UTI  -Get TTE risk stratification for arrhythmia.  -Cont IV hydration  -Replete phos/k/mg  -Cont on metoprolol for rate control, would consider Cardizem drip if needed for rate control   -TSH 1.180  -Not a candidate for aggressive interventions due to baseline dementia/debility.   80 yo female with hx of DM, HLD, dementia  admitted for altered mental status likely in the setting of acute UTI.  + UA, Culture - Urine (03.12.22 @ 18:42)>100,000 CFU/ml Proteus mirabilis  Appears fairly dehydrated clinically  New "atrial fibrillation" with RVR    -Continue to monitor on telemetry for rhythm assessment. May be exacerbated due to dehydration and hypernatremia, should improve with hydration.  -On IV antibiotics, as per medicine for UTI  -Get TTE risk stratification for arrhythmia.  -Cont IV hydration  -Replete phos/k/mg  -Cont on metoprolol for rate control, would consider Cardizem drip if needed for rate control   -? AC candidate, palliative care eval  -TSH 1.180  -Not a candidate for aggressive interventions due to baseline dementia/debility.   82 yo female with hx of DM, HLD, dementia  admitted for altered mental status likely in the setting of acute UTI.  + UA, Culture - Urine (03.12.22 @ 18:42)>100,000 CFU/ml Proteus mirabilis  Appears fairly dehydrated clinically  New "atrial fibrillation" appears to be sinus with APC's,    -Continue to monitor on telemetry for rhythm assessment. May be exacerbated due to dehydration and hypernatremia, should improve with hydration.  -On IV antibiotics, as per medicine for UTI  -Get TTE risk stratification for arrhythmia.  -Cont IV hydration  -Replete phos/k/mg  -Cont on metoprolol for rate control, would consider Cardizem drip if needed for rate control   -TSH 1.180  -palliative care following   -Not a candidate for aggressive interventions due to baseline dementia/debility.   82 yo female with hx of DM, HLD, dementia  admitted for altered mental status likely in the setting of acute UTI.  + UA, Culture - Urine (03.12.22 @ 18:42)>100,000 CFU/ml Proteus mirabilis  Appears fairly dehydrated clinically  New "atrial fibrillation" appears to be sinus with APC's, also noted to have short runs of NSVTs    -Continue to monitor on telemetry for rhythm assessment. May be exacerbated due to dehydration and hypernatremia, should improve with hydration.  -On IV antibiotics, as per medicine for UTI  -Get TTE risk stratification for arrhythmia.  -Cont IV hydration  -Replete phos/k/mg  -Cont on metoprolol for rate control, would consider Cardizem drip if needed for rate control   -TSH 1.180  -palliative care following   -Not a candidate for aggressive interventions due to baseline dementia/debility.

## 2022-03-15 NOTE — PROGRESS NOTE ADULT - SUBJECTIVE AND OBJECTIVE BOX
Patient is a 81y old  Female who presents with a chief complaint of Lethargy at home with decreased PO intake. (14 Mar 2022 15:30)    INTERVAL HPI/OVERNIGHT EVENTS: Patients seen and examined at bedside this morning. No acute events overnight. Pt reports wanting water. Seen at bedside with  at 11:45 am (Stef). telephone number verified [phone]. As per , they have been  for 50+ years. She has advanced stage dementia for several years now currently bedridden. Full assist. Only eats yoghurt and ice cream at times to maintain nutrition. Lives with  and mentally challenged son (40s).  takes care of wife and son with no help. They have 7 other children who don't assist or stop by to visit. Patient wants to be home. Denies CP.     MEDICATIONS  (STANDING):  cefTRIAXone   IVPB 1000 milliGRAM(s) IV Intermittent every 24 hours  dextrose 40% Gel 15 Gram(s) Oral once  dextrose 5%. 1000 milliLiter(s) (50 mL/Hr) IV Continuous <Continuous>  dextrose 5%. 1000 milliLiter(s) (100 mL/Hr) IV Continuous <Continuous>  dextrose 50% Injectable 25 Gram(s) IV Push once  dextrose 50% Injectable 12.5 Gram(s) IV Push once  dextrose 50% Injectable 25 Gram(s) IV Push once  glucagon  Injectable 1 milliGRAM(s) IntraMuscular once  heparin   Injectable 5000 Unit(s) SubCutaneous every 12 hours  influenza  Vaccine (HIGH DOSE) 0.7 milliLiter(s) IntraMuscular once  insulin lispro (ADMELOG) corrective regimen sliding scale   SubCutaneous three times a day before meals  insulin lispro (ADMELOG) corrective regimen sliding scale   SubCutaneous at bedtime  metoprolol tartrate 25 milliGRAM(s) Oral two times a day  nystatin Powder 1 Application(s) Topical every 8 hours  potassium chloride    Tablet ER 40 milliEquivalent(s) Oral every 4 hours  sodium chloride 0.9%. 1000 milliLiter(s) (75 mL/Hr) IV Continuous <Continuous>    MEDICATIONS  (PRN):    Allergies    No Known Allergies    Intolerances      REVIEW OF SYSTEMS:  All other systems reviewed and are negative    Vital Signs Last 24 Hrs  T(C): 36.3 (15 Mar 2022 10:50), Max: 36.8 (14 Mar 2022 16:00)  T(F): 97.4 (15 Mar 2022 10:50), Max: 98.2 (14 Mar 2022 16:00)  HR: 121 (15 Mar 2022 11:38) (59 - 164)  BP: 113/78 (15 Mar 2022 10:50) (109/64 - 126/72)  BP(mean): --  RR: 16 (15 Mar 2022 10:50) (16 - 18)  SpO2: 99% (15 Mar 2022 10:50) (99% - 100%)  Daily     Daily Weight in k.5 (15 Mar 2022 06:28)  I&O's Summary    14 Mar 2022 07:01  -  15 Mar 2022 07:00  --------------------------------------------------------  IN: 2070 mL / OUT: 0 mL / NET: 2070 mL      CAPILLARY BLOOD GLUCOSE      POCT Blood Glucose.: 177 mg/dL (15 Mar 2022 11:42)  POCT Blood Glucose.: 77 mg/dL (15 Mar 2022 08:09)  POCT Blood Glucose.: 117 mg/dL (14 Mar 2022 22:21)  POCT Blood Glucose.: 162 mg/dL (14 Mar 2022 16:52)    PHYSICAL EXAM:  GENERAL: NAD, chronically ill appearing  HEAD:  Atraumatic, Normocephalic  EYES: EOMI, PERRLA, conjunctiva and sclera clear  ENMT: No tonsillar erythema, exudates, or enlargement; dry mucous membranes, Good dentition, No lesions  NECK: Supple, No JVD, Normal thyroid  NERVOUS SYSTEM:  Alert & Oriented X0, Good concentration; Motor Strength 3/5 B/L upper and lower extremities; DTRs 2+ intact and symmetric  CHEST/LUNG: Clear to percussion bilaterally; No rales, rhonchi, wheezing, or rubs  HEART: tachycardic, Afib; No murmurs, rubs, or gallops  ABDOMEN: Soft, Nontender, Nondistended; Bowel sounds present  EXTREMITIES:  2+ Peripheral Pulses, No clubbing, cyanosis, or edema  LYMPH: No lymphadenopathy noted  SKIN: No rashes or lesions    Labs      03-15    148<H>  |  117<H>  |  10  ----------------------------<  82  3.0<L>   |  26  |  0.39<L>    Ca    8.5      15 Mar 2022 07:40                Culture - Urine (collected 12 Mar 2022 18:42)  Source: Clean Catch Clean Catch (Midstream)  Final Report (15 Mar 2022 11:46):    >100,000 CFU/ml Proteus mirabilis  Organism: Proteus mirabilis (15 Mar 2022 11:46)  Organism: Proteus mirabilis (15 Mar 2022 11:46)

## 2022-03-15 NOTE — PROGRESS NOTE ADULT - SUBJECTIVE AND OBJECTIVE BOX
SUBJECTIVE AND OBJECTIVE: Patient sitting up in bed awake and alert  INTERVAL HPI/OVERNIGHT EVENTS:    DNR on chart: no  Allergies    No Known Allergies    Intolerances    MEDICATIONS  (STANDING):  cefTRIAXone   IVPB 1000 milliGRAM(s) IV Intermittent every 24 hours  dextrose 40% Gel 15 Gram(s) Oral once  dextrose 5%. 1000 milliLiter(s) (50 mL/Hr) IV Continuous <Continuous>  dextrose 5%. 1000 milliLiter(s) (100 mL/Hr) IV Continuous <Continuous>  dextrose 50% Injectable 25 Gram(s) IV Push once  dextrose 50% Injectable 12.5 Gram(s) IV Push once  dextrose 50% Injectable 25 Gram(s) IV Push once  glucagon  Injectable 1 milliGRAM(s) IntraMuscular once  heparin   Injectable 5000 Unit(s) SubCutaneous every 12 hours  influenza  Vaccine (HIGH DOSE) 0.7 milliLiter(s) IntraMuscular once  insulin lispro (ADMELOG) corrective regimen sliding scale   SubCutaneous three times a day before meals  insulin lispro (ADMELOG) corrective regimen sliding scale   SubCutaneous at bedtime  metoprolol tartrate 25 milliGRAM(s) Oral two times a day  nystatin Powder 1 Application(s) Topical every 8 hours  potassium chloride    Tablet ER 40 milliEquivalent(s) Oral every 4 hours  sodium chloride 0.9%. 1000 milliLiter(s) (75 mL/Hr) IV Continuous <Continuous>    MEDICATIONS  (PRN):      ITEMS UNCHECKED ARE NOT PRESENT    PRESENT SYMPTOMS: [ x]Unable to obtain due to poor mentation   Source if other than patient:  [ ]Family   [ ]Team     Pain:  [ x]yes [ ]no Patient unable to articulate pain location, descriptors or timing  QOL impact -   Location -                    Aggravating factors -  Quality -  Radiation -  Timing-  Severity (0-10 scale):  Minimal acceptable level (0-10 scale):     Dyspnea:                           [ ]Mild [ ]Moderate [ ]Severe  Anxiety:                             [ ]Mild [ ]Moderate [ ]Severe  Fatigue:                             [ ]Mild [ ]Moderate [ ]Severe  Nausea:                             [ ]Mild [ ]Moderate [ ]Severe  Loss of appetite:              [ ]Mild [ ]Moderate [ ]Severe  Constipation:                    [ ]Mild [ ]Moderate [ ]Severe    PAIN AD Score:	2  [email or web address] (Ctrl + left click to view)    Other Symptoms:  [ ]All other review of systems negative     Palliative Performance Status Version 2:       20-30  %      http://npcrc.org/files/news/palliative_performance_scale_ppsv2.pdf  PHYSICAL EXAM:  Vital Signs Last 24 Hrs  T(C): 36.3 (15 Mar 2022 10:50), Max: 36.8 (14 Mar 2022 16:00)  T(F): 97.4 (15 Mar 2022 10:50), Max: 98.2 (14 Mar 2022 16:00)  HR: 121 (15 Mar 2022 11:38) (59 - 164)  BP: 113/78 (15 Mar 2022 10:50) (109/64 - 126/72)  BP(mean): --  RR: 16 (15 Mar 2022 10:50) (16 - 18)  SpO2: 99% (15 Mar 2022 10:50) (99% - 100%) I&O's Summary    14 Mar 2022 07:01  -  15 Mar 2022 07:00  --------------------------------------------------------  IN: 2070 mL / OUT: 0 mL / NET: 2070 mL       GENERAL:  [x ]Alert  [x ]Oriented x1   [ ]Lethargic  [ ]Cachexia  [ ]Unarousable  [x ]Verbal  [ ]Non-Verbal  Behavioral:   [ ]Anxiety  [ ]Delirium [ ]Agitation [ ]Other  HEENT:  [ ]Normal   [ ]Dry mouth   [ ]ET Tube/Trach  [ ]Oral lesions  PULMONARY:   [ ]Clear [ ]Tachypnea  [ ]Audible excessive secretions   [ ]Rhonchi        [ ]Right [ ]Left [ ]Bilateral  [ ]Crackles        [ ]Right [ ]Left [ ]Bilateral  [ ]Wheezing     [ ]Right [ ]Left [ ]Bilateral  [x ]Diminished BS [ ] Right [ ]Left [x ]Bilateral  CARDIOVASCULAR:    [ ]Regular [xIrregular [x]Tachy  [ ]Yunior [ ]Murmur [ ]Other  GASTROINTESTINAL:  [xSoft  [ ]Distended   [ ]+BS  [ ]Non tender [ ]Tender  [ ]PEG [ ]OGT/ NGT   Last BM:  3/13/22  GENITOURINARY:  [ ]Normal [x ]Incontinent   [ ]Oliguria/Anuria   [ ]Gaspar  MUSCULOSKELETAL:   [ ]Normal   [ ]Weakness  [x ]Bed/Wheelchair bound [ ]Edema  NEUROLOGIC:   [ ]No focal deficits  [x ] Cognitive impairment  [ ] Dysphagia [ ]Dysarthria [ ] Paresis [ ]Other   SKIN:   [ ]Normal  [ ]Rash   [x ]Pressure ulcer(s) sacrum stage II, cocyx stage II, Left heel DTI [ ]y [ ]n present on admission    CRITICAL CARE:  [ ]Shock Present  [ ]Septic [ ]Cardiogenic [ ]Neurologic [ ]Hypovolemic  [ ]Vasopressors [ ]Inotropes  [ ]Respiratory failure present [ ]Mechanical Ventilation [ ]Non-invasive ventilatory support [ ]High-Flow  [ ]Acute  [ ]Chronic [ ]Hypoxic  [ ]Hypercarbic [ ]Other  [ ]Other organ failure     LABS:                        11.7   6.15  )-----------( 166      ( 15 Mar 2022 12:10 )             35.7   03-15    148<H>  |  117<H>  |  10  ----------------------------<  82  3.0<L>   |  26  |  0.39<L>    Ca    8.5      15 Mar 2022 07:40  Phos  2.0     03-15  Mg     1.9     03-15      RADIOLOGY & ADDITIONAL STUDIES: none new    Protein Calorie Malnutrition Present: [ ]mild [ x]moderate [ ]severe [ ]underweight [ ]morbid obesity  https://www.andeal.org/vault/2440/web/files/ONC/Table_Clinical%20Characteristics%20to%20Document%20Malnutrition-White%20JV%20et%20al%202012.pdf    Height (cm): 165.1 (03-12-22 @ 09:29)  Weight (kg): 38.6 (03-12-22 @ 16:40)  BMI (kg/m2): 14.2 (03-12-22 @ 16:40)    [x ]PPSV2 < or = 30%  [ ]significant weight loss [ x]poor nutritional intake [ ]anasarca    [ ]Artificial Nutrition    REFERRALS:   [ ]Chaplaincy  [ ]Hospice  [ ]Child Life  [ ]Social Work  [ ]Case management [ ]Holistic Therapy     Goals of Care Document:

## 2022-03-15 NOTE — PROVIDER CONTACT NOTE (HYPOGLYCEMIA EVENT) - NS PROVIDER CONTACT BACKGROUND-HYPO
Age: 81y    Gender: Female    POCT Blood Glucose:  79 mg/dL (03-15-22 @ 21:03)  75 mg/dL (03-15-22 @ 21:01)  166 mg/dL (03-15-22 @ 17:18)  177 mg/dL (03-15-22 @ 11:42)  77 mg/dL (03-15-22 @ 08:09)  117 mg/dL (03-14-22 @ 22:21)      eMAR:  insulin lispro (ADMELOG) corrective regimen sliding scale   2 Unit(s) SubCutaneous (03-15-22 @ 17:19)   2 Unit(s) SubCutaneous (03-15-22 @ 11:45)

## 2022-03-15 NOTE — PROGRESS NOTE ADULT - SUBJECTIVE AND OBJECTIVE BOX
Patient is a 81y old  Female who presents with Lethargy at home with decreased PO intake. (15 Mar 2022 13:23)    PAST MEDICAL & SURGICAL HISTORY:  DM (diabetes mellitus)    HLD    Dementia     INTERVAL HISTORY: awake, responsive, pleasantly confused, in no acute distress   	  MEDICATIONS:  MEDICATIONS  (STANDING):  cefTRIAXone   IVPB 1000 milliGRAM(s) IV Intermittent every 24 hours  heparin   Injectable 5000 Unit(s) SubCutaneous every 12 hours  influenza  Vaccine (HIGH DOSE) 0.7 milliLiter(s) IntraMuscular once  insulin lispro (ADMELOG) corrective regimen sliding scale   SubCutaneous three times a day before meals  insulin lispro (ADMELOG) corrective regimen sliding scale   SubCutaneous at bedtime  metoprolol tartrate 25 milliGRAM(s) Oral two times a day  nystatin Powder 1 Application(s) Topical every 8 hours  potassium chloride    Tablet ER 40 milliEquivalent(s) Oral every 4 hours  potassium phosphate / sodium phosphate Powder (PHOS-NaK) 1 Packet(s) Oral three times a day  sodium chloride 0.9%. 1000 milliLiter(s) (75 mL/Hr) IV Continuous <Continuous>    Vitals:  T(F): 97.4 (03-15-22 @ 10:50), Max: 98.1 (03-15-22 @ 01:06)  HR: 90 (03-15-22 @ 14:10) (59 - 164)  BP: 101/69 (03-15-22 @ 14:10) (101/69 - 113/78)  RR: 18 (03-15-22 @ 14:10) (16 - 18)  SpO2: 99% (03-15-22 @ 14:10) (99% - 100%)    03-14 @ 07:01  -  03-15 @ 07:00  --------------------------------------------------------  IN:    IV PiggyBack: 50 mL    Oral Fluid: 120 mL    sodium chloride 0.9%: 1900 mL  Total IN: 2070 mL    OUT:  Total OUT: 0 mL    Total NET: 2070 mL    Weight (kg): 38.6 (03-12 @ 16:40)    PHYSICAL EXAM:  Neuro: Awake, responsive  CV: S1 S2 irreg irregular   Lungs: CTABL  GI: Soft, BS +, ND, NT  Extremities: No edema    TELEMETRY: atrial fibrillation     RADIOLOGY: < from: Xray Chest 1 View- PORTABLE-Urgent (Xray Chest 1 View- PORTABLE-Urgent .) (03.12.22 @ 11:03) >  Heart size is normal. Lungs show no acute infiltrate. No pleural effusion.    IMPRESSION:  No active parenchymal disease in the chest.    < end of copied text >    DIAGNOSTIC TESTING:    [p ] Echocardiogram:     LABS:	 	    CARDIAC MARKERS:  Troponin I, High Sensitivity Result: 71.1 ng/L (03-13 @ 07:53)    15 Mar 2022 07:40    148    |  117    |  10     ----------------------------<  82     3.0     |  26     |  0.39   14 Mar 2022 17:54    148    |  118    |  13     ----------------------------<  157    3.4     |  27     |  0.41   13 Mar 2022 11:41    153    |  122    |  29     ----------------------------<  168    4.0     |  29     |  0.58     Ca    8.5        15 Mar 2022 07:40  Phos  2.0       15 Mar 2022 12:10  Mg     1.9       15 Mar 2022 12:10                        11.7   6.15  )-----------( 166      ( 15 Mar 2022 12:10 )             35.7     TSH: Thyroid Stimulating Hormone, Serum: 1.180 uU/mL (03-15 @ 12:10)                   Patient is a 81y old  Female who presents with Lethargy at home with decreased PO intake. (15 Mar 2022 13:23)    PAST MEDICAL & SURGICAL HISTORY:  DM (diabetes mellitus)    HLD    Dementia     INTERVAL HISTORY: awake, responsive, pleasantly confused, in no acute distress   	  MEDICATIONS:  MEDICATIONS  (STANDING):  cefTRIAXone   IVPB 1000 milliGRAM(s) IV Intermittent every 24 hours  heparin   Injectable 5000 Unit(s) SubCutaneous every 12 hours  influenza  Vaccine (HIGH DOSE) 0.7 milliLiter(s) IntraMuscular once  insulin lispro (ADMELOG) corrective regimen sliding scale   SubCutaneous three times a day before meals  insulin lispro (ADMELOG) corrective regimen sliding scale   SubCutaneous at bedtime  metoprolol tartrate 25 milliGRAM(s) Oral two times a day  nystatin Powder 1 Application(s) Topical every 8 hours  potassium chloride    Tablet ER 40 milliEquivalent(s) Oral every 4 hours  potassium phosphate / sodium phosphate Powder (PHOS-NaK) 1 Packet(s) Oral three times a day  sodium chloride 0.9%. 1000 milliLiter(s) (75 mL/Hr) IV Continuous <Continuous>    Vitals:  T(F): 97.4 (03-15-22 @ 10:50), Max: 98.1 (03-15-22 @ 01:06)  HR: 90 (03-15-22 @ 14:10) (59 - 164)  BP: 101/69 (03-15-22 @ 14:10) (101/69 - 113/78)  RR: 18 (03-15-22 @ 14:10) (16 - 18)  SpO2: 99% (03-15-22 @ 14:10) (99% - 100%)    03-14 @ 07:01  -  03-15 @ 07:00  --------------------------------------------------------  IN:    IV PiggyBack: 50 mL    Oral Fluid: 120 mL    sodium chloride 0.9%: 1900 mL  Total IN: 2070 mL    OUT:  Total OUT: 0 mL    Total NET: 2070 mL    Weight (kg): 38.6 (03-12 @ 16:40)    PHYSICAL EXAM:  Neuro: Awake, responsive  CV: S1 S2  irregular   Lungs: CTABL  GI: Soft, BS +, ND, NT  Extremities: No edema    TELEMETRY: sinus with PACs, PVCs, NSVTs    RADIOLOGY: < from: Xray Chest 1 View- PORTABLE-Urgent (Xray Chest 1 View- PORTABLE-Urgent .) (03.12.22 @ 11:03) >  Heart size is normal. Lungs show no acute infiltrate. No pleural effusion.    IMPRESSION:  No active parenchymal disease in the chest.    < end of copied text >    DIAGNOSTIC TESTING:    [p ] Echocardiogram:     LABS:	 	    CARDIAC MARKERS:  Troponin I, High Sensitivity Result: 71.1 ng/L (03-13 @ 07:53)    15 Mar 2022 07:40    148    |  117    |  10     ----------------------------<  82     3.0     |  26     |  0.39   14 Mar 2022 17:54    148    |  118    |  13     ----------------------------<  157    3.4     |  27     |  0.41   13 Mar 2022 11:41    153    |  122    |  29     ----------------------------<  168    4.0     |  29     |  0.58     Ca    8.5        15 Mar 2022 07:40  Phos  2.0       15 Mar 2022 12:10  Mg     1.9       15 Mar 2022 12:10                        11.7   6.15  )-----------( 166      ( 15 Mar 2022 12:10 )             35.7     TSH: Thyroid Stimulating Hormone, Serum: 1.180 uU/mL (03-15 @ 12:10)

## 2022-03-15 NOTE — PROGRESS NOTE ADULT - ASSESSMENT
81 year old female PMH DM, HLD, advanced dementia presented with altered mental status admitted for metabolic encephalopathy 2/2 UTI found to have to onset Afib.    New onset raipid afib on telemetry, Demand ischemia 2/2 dehydration  Received Cardizem yesterday. Currently in afib with HR in 130-140s maintained.   Pt will receive Cardizem again as per cardio  Start metoprolol  troponins trending down  not reporting CP  Cardio eval  EKG repeat     Metabolic encephalopathy 2/2 UTI and advanced dementia  Lactate elevated initially.   LR bolus --> C/W D5+1/2 NS @ 75cc/hr  Speech swallow study: passed  Pt received ceftriaxone X1. Will continue to treat for proteus. sensitives consistent.   urine cultures: Proteus sensitive to ceftriaxone    Dehydration  LR bolus    Advanced dementia  Palliative care consult    Hyperglycemia 2/2 uncontrolled diabetes  Received fluid bolus. C/W IVFs  NPO, ISS ACHS    HyperNa+ (improving)  2/2 dehydration/hyperglycemia    DVT PPX  heparin    Wound care consult for sacral ulcers

## 2022-03-15 NOTE — PHARMACOTHERAPY INTERVENTION NOTE - COMMENTS
Pharmacy Medication Reconciliation Note:  Contact pharmacy: [facility] [place]    List of Medications:  - Atorvastatin 20 mg daily (Last picked up on 1/12/22 for 90 days supply)  - Glipizide 10 mg q12h (Last picked up on 1/12/22 for 90 days supply)

## 2022-03-16 LAB
ANION GAP SERPL CALC-SCNC: 3 MMOL/L — LOW (ref 5–17)
BUN SERPL-MCNC: 6 MG/DL — LOW (ref 7–23)
CALCIUM SERPL-MCNC: 8.5 MG/DL — SIGNIFICANT CHANGE UP (ref 8.5–10.1)
CHLORIDE SERPL-SCNC: 113 MMOL/L — HIGH (ref 96–108)
CHOLEST SERPL-MCNC: 131 MG/DL — SIGNIFICANT CHANGE UP
CO2 SERPL-SCNC: 25 MMOL/L — SIGNIFICANT CHANGE UP (ref 22–31)
CREAT SERPL-MCNC: 0.44 MG/DL — LOW (ref 0.5–1.3)
EGFR: 97 ML/MIN/1.73M2 — SIGNIFICANT CHANGE UP
GLUCOSE BLDC GLUCOMTR-MCNC: 130 MG/DL — HIGH (ref 70–99)
GLUCOSE BLDC GLUCOMTR-MCNC: 160 MG/DL — HIGH (ref 70–99)
GLUCOSE BLDC GLUCOMTR-MCNC: 205 MG/DL — HIGH (ref 70–99)
GLUCOSE BLDC GLUCOMTR-MCNC: 97 MG/DL — SIGNIFICANT CHANGE UP (ref 70–99)
GLUCOSE SERPL-MCNC: 103 MG/DL — HIGH (ref 70–99)
HCT VFR BLD CALC: 41.6 % — SIGNIFICANT CHANGE UP (ref 34.5–45)
HDLC SERPL-MCNC: 41 MG/DL — LOW
HGB BLD-MCNC: 13.2 G/DL — SIGNIFICANT CHANGE UP (ref 11.5–15.5)
LIPID PNL WITH DIRECT LDL SERPL: 70 MG/DL — SIGNIFICANT CHANGE UP
MAGNESIUM SERPL-MCNC: 2 MG/DL — SIGNIFICANT CHANGE UP (ref 1.6–2.6)
MCHC RBC-ENTMCNC: 29 PG — SIGNIFICANT CHANGE UP (ref 27–34)
MCHC RBC-ENTMCNC: 31.7 G/DL — LOW (ref 32–36)
MCV RBC AUTO: 91.4 FL — SIGNIFICANT CHANGE UP (ref 80–100)
NON HDL CHOLESTEROL: 91 MG/DL — SIGNIFICANT CHANGE UP
NRBC # BLD: 0 /100 WBCS — SIGNIFICANT CHANGE UP (ref 0–0)
PHOSPHATE SERPL-MCNC: 2.1 MG/DL — LOW (ref 2.5–4.5)
PLATELET # BLD AUTO: SIGNIFICANT CHANGE UP K/UL (ref 150–400)
POTASSIUM SERPL-MCNC: 4.4 MMOL/L — SIGNIFICANT CHANGE UP (ref 3.5–5.3)
POTASSIUM SERPL-SCNC: 4.4 MMOL/L — SIGNIFICANT CHANGE UP (ref 3.5–5.3)
RBC # BLD: 4.55 M/UL — SIGNIFICANT CHANGE UP (ref 3.8–5.2)
RBC # FLD: 14.8 % — HIGH (ref 10.3–14.5)
SODIUM SERPL-SCNC: 141 MMOL/L — SIGNIFICANT CHANGE UP (ref 135–145)
TRIGL SERPL-MCNC: 101 MG/DL — SIGNIFICANT CHANGE UP
WBC # BLD: 6.19 K/UL — SIGNIFICANT CHANGE UP (ref 3.8–10.5)
WBC # FLD AUTO: 6.19 K/UL — SIGNIFICANT CHANGE UP (ref 3.8–10.5)

## 2022-03-16 PROCEDURE — 99233 SBSQ HOSP IP/OBS HIGH 50: CPT

## 2022-03-16 PROCEDURE — 99232 SBSQ HOSP IP/OBS MODERATE 35: CPT

## 2022-03-16 RX ORDER — ATORVASTATIN CALCIUM 80 MG/1
20 TABLET, FILM COATED ORAL AT BEDTIME
Refills: 0 | Status: DISCONTINUED | OUTPATIENT
Start: 2022-03-16 | End: 2022-03-22

## 2022-03-16 RX ORDER — ASPIRIN/CALCIUM CARB/MAGNESIUM 324 MG
81 TABLET ORAL DAILY
Refills: 0 | Status: DISCONTINUED | OUTPATIENT
Start: 2022-03-16 | End: 2022-03-22

## 2022-03-16 RX ADMIN — NYSTATIN CREAM 1 APPLICATION(S): 100000 CREAM TOPICAL at 21:35

## 2022-03-16 RX ADMIN — CEFTRIAXONE 100 MILLIGRAM(S): 500 INJECTION, POWDER, FOR SOLUTION INTRAMUSCULAR; INTRAVENOUS at 14:29

## 2022-03-16 RX ADMIN — ATORVASTATIN CALCIUM 20 MILLIGRAM(S): 80 TABLET, FILM COATED ORAL at 21:35

## 2022-03-16 RX ADMIN — HEPARIN SODIUM 5000 UNIT(S): 5000 INJECTION INTRAVENOUS; SUBCUTANEOUS at 06:07

## 2022-03-16 RX ADMIN — Medication 4: at 12:15

## 2022-03-16 RX ADMIN — NYSTATIN CREAM 1 APPLICATION(S): 100000 CREAM TOPICAL at 14:27

## 2022-03-16 RX ADMIN — Medication 81 MILLIGRAM(S): at 17:19

## 2022-03-16 RX ADMIN — Medication 12.5 MILLIGRAM(S): at 06:07

## 2022-03-16 RX ADMIN — HEPARIN SODIUM 5000 UNIT(S): 5000 INJECTION INTRAVENOUS; SUBCUTANEOUS at 17:19

## 2022-03-16 RX ADMIN — NYSTATIN CREAM 1 APPLICATION(S): 100000 CREAM TOPICAL at 06:05

## 2022-03-16 RX ADMIN — Medication 1 PACKET(S): at 17:19

## 2022-03-16 RX ADMIN — Medication 12.5 MILLIGRAM(S): at 17:19

## 2022-03-16 RX ADMIN — SODIUM CHLORIDE 75 MILLILITER(S): 9 INJECTION INTRAMUSCULAR; INTRAVENOUS; SUBCUTANEOUS at 14:28

## 2022-03-16 RX ADMIN — Medication 1 PACKET(S): at 06:05

## 2022-03-16 RX ADMIN — Medication 1 PACKET(S): at 21:35

## 2022-03-16 NOTE — PROGRESS NOTE ADULT - PROBLEM SELECTOR PROBLEM 6
Pressure ulcers of skin of multiple topographic sites
Pressure ulcers of skin of multiple topographic sites

## 2022-03-16 NOTE — PROGRESS NOTE ADULT - ASSESSMENT
80 yo female with hx of DM, HLD, dementia  admitted for altered mental status likely in the setting of acute UTI.  + UA, Culture - Urine (03.12.22 @ 18:42)>100,000 CFU/ml Proteus mirabilis  Appears fairly dehydrated clinically on admit, + hypernatremia   New "atrial fibrillation" appears to be sinus with APC's, also noted to have short runs of NSVTs 3/15  -TTE with Ef 35-40%, The mid and apical inferior wall is hypokinetic, Grade I DD, mild-mod MR, mod TR, mild NM    -Cont on metoprolol, ventricular arrhthymias now improved, titrate up bbl as BP and HR tolerate, can be switched Toprol prior to discharge, will add low dose ACE-I/ARB if BP still allows   -Restart statin and add asa 81-> monitor platelets   -TSH 1.180  -s/p antibiotics Tx for UTI  -Hypernatremia now resolved with IV hydration  -Not a candidate for aggressive interventions due to baseline dementia/debility  -palliative care following,  pending hospice referral

## 2022-03-16 NOTE — PROGRESS NOTE ADULT - SUBJECTIVE AND OBJECTIVE BOX
Patient is a 81y old  Female who presents with a chief complaint of Lethargy at home with decreased PO intake. (14 Mar 2022 15:30)    INTERVAL HPI/OVERNIGHT EVENTS: Patients seen and examined at bedside this morning. No acute events overnight. Pt reports wanting water. Seen at bedside with  at 11:45 am (Stef). telephone number verified [phone]. As per , they have been  for 50+ years. She has advanced stage dementia for several years now currently bedridden. Full assist. Only eats yoghurt and ice cream at times to maintain nutrition. Lives with  and mentally challenged son (40s).  takes care of wife and son with no help. They have 7 other children who don't assist or stop by to visit. Patient wants to be home. Denies CP.     MEDICATIONS  (STANDING):  aspirin  chewable 81 milliGRAM(s) Oral daily  atorvastatin 20 milliGRAM(s) Oral at bedtime  dextrose 40% Gel 15 Gram(s) Oral once  dextrose 5%. 1000 milliLiter(s) (50 mL/Hr) IV Continuous <Continuous>  dextrose 5%. 1000 milliLiter(s) (100 mL/Hr) IV Continuous <Continuous>  dextrose 50% Injectable 25 Gram(s) IV Push once  dextrose 50% Injectable 12.5 Gram(s) IV Push once  dextrose 50% Injectable 25 Gram(s) IV Push once  glucagon  Injectable 1 milliGRAM(s) IntraMuscular once  heparin   Injectable 5000 Unit(s) SubCutaneous every 12 hours  influenza  Vaccine (HIGH DOSE) 0.7 milliLiter(s) IntraMuscular once  insulin lispro (ADMELOG) corrective regimen sliding scale   SubCutaneous three times a day before meals  insulin lispro (ADMELOG) corrective regimen sliding scale   SubCutaneous at bedtime  metoprolol tartrate 12.5 milliGRAM(s) Oral two times a day  nystatin Powder 1 Application(s) Topical every 8 hours  potassium phosphate / sodium phosphate Powder (PHOS-NaK) 1 Packet(s) Oral three times a day    MEDICATIONS  (PRN):    Allergies    No Known Allergies    Intolerances      REVIEW OF SYSTEMS:  All other systems reviewed and are negative    Vital Signs Last 24 Hrs  T(C): 36.4 (16 Mar 2022 16:25), Max: 36.8 (16 Mar 2022 10:49)  T(F): 97.5 (16 Mar 2022 16:25), Max: 98.2 (16 Mar 2022 10:49)  HR: 85 (16 Mar 2022 16:25) (54 - 100)  BP: 108/57 (16 Mar 2022 16:25) (103/67 - 124/74)  BP(mean): 90 (16 Mar 2022 04:38) (90 - 90)  RR: 17 (16 Mar 2022 16:25) (17 - 18)  SpO2: 98% (16 Mar 2022 16:25) (98% - 100%)    PHYSICAL EXAM:  GENERAL: NAD, chronically ill appearing  HEAD:  Atraumatic, Normocephalic  EYES: EOMI, PERRLA, conjunctiva and sclera clear  ENMT: No tonsillar erythema, exudates, or enlargement; dry mucous membranes, Good dentition, No lesions  NECK: Supple  NERVOUS SYSTEM:  Alert & Oriented X0, Good concentration; Motor Strength 3/5 B/L upper and lower extremities; DTRs 2+ intact and symmetric  CHEST/LUNG: Clear to percussion bilaterally; No rales, rhonchi, wheezing, or rubs  HEART: tachycardic, Afib; No murmurs, rubs, or gallops  ABDOMEN: Soft, Nontender, Nondistended; Bowel sounds present  EXTREMITIES:  2+ Peripheral Pulses, No clubbing, cyanosis, or edema    SKIN: No rashes or lesions    Labs                          13.2   6.19  )-----------( CLUMPED    ( 16 Mar 2022 07:31 )             41.6   03-16    141  |  113<H>  |  6<L>  ----------------------------<  103<H>  4.4   |  25  |  0.44<L>    Ca    8.5      16 Mar 2022 07:31  Phos  2.1     03-16  Mg     2.0     03-16                  Culture - Urine (collected 12 Mar 2022 18:42)  Source: Clean Catch Clean Catch (Midstream)  Final Report (15 Mar 2022 11:46):    >100,000 CFU/ml Proteus mirabilis  Organism: Proteus mirabilis (15 Mar 2022 11:46)  Organism: Proteus mirabilis (15 Mar 2022 11:46)                 Patient is a 81y old  Female who presents with a chief complaint of Lethargy at home with decreased PO intake. (14 Mar 2022 15:30)    INTERVAL HPI/OVERNIGHT EVENTS: Patients seen and examined at bedside this morning. No acute events overnight. Pt reports wanting water. Seen at bedside with  at 11:45 am (Stef). telephone number verified [phone]. As per , they have been  for 50+ years. She has advanced stage dementia for several years now currently bedridden. Full assist. Only eats yoghurt and ice cream at times to maintain nutrition. Lives with  and mentally challenged son (40s).  takes care of wife and son with no help. They have 7 other children who don't assist or stop by to visit. Patient wants to be home. Denies CP.     MEDICATIONS  (STANDING):  aspirin  chewable 81 milliGRAM(s) Oral daily  atorvastatin 20 milliGRAM(s) Oral at bedtime  dextrose 40% Gel 15 Gram(s) Oral once  dextrose 5%. 1000 milliLiter(s) (50 mL/Hr) IV Continuous <Continuous>  dextrose 5%. 1000 milliLiter(s) (100 mL/Hr) IV Continuous <Continuous>  dextrose 50% Injectable 25 Gram(s) IV Push once  dextrose 50% Injectable 12.5 Gram(s) IV Push once  dextrose 50% Injectable 25 Gram(s) IV Push once  glucagon  Injectable 1 milliGRAM(s) IntraMuscular once  heparin   Injectable 5000 Unit(s) SubCutaneous every 12 hours  influenza  Vaccine (HIGH DOSE) 0.7 milliLiter(s) IntraMuscular once  insulin lispro (ADMELOG) corrective regimen sliding scale   SubCutaneous three times a day before meals  insulin lispro (ADMELOG) corrective regimen sliding scale   SubCutaneous at bedtime  metoprolol tartrate 12.5 milliGRAM(s) Oral two times a day  nystatin Powder 1 Application(s) Topical every 8 hours  potassium phosphate / sodium phosphate Powder (PHOS-NaK) 1 Packet(s) Oral three times a day    MEDICATIONS  (PRN):    Allergies    No Known Allergies    Intolerances      REVIEW OF SYSTEMS:  All other systems reviewed and are negative    Vital Signs Last 24 Hrs  T(C): 36.4 (16 Mar 2022 16:25), Max: 36.8 (16 Mar 2022 10:49)  T(F): 97.5 (16 Mar 2022 16:25), Max: 98.2 (16 Mar 2022 10:49)  HR: 85 (16 Mar 2022 16:25) (54 - 100)  BP: 108/57 (16 Mar 2022 16:25) (103/67 - 124/74)  BP(mean): 90 (16 Mar 2022 04:38) (90 - 90)  RR: 17 (16 Mar 2022 16:25) (17 - 18)  SpO2: 98% (16 Mar 2022 16:25) (98% - 100%)    PHYSICAL EXAM:  GENERAL: NAD, chronically ill appearing  HEAD:  Atraumatic, Normocephalic  EYES: EOMI, PERRLA, conjunctiva and sclera clear  ENMT: No tonsillar erythema, exudates, or enlargement; dry mucous membranes, Good dentition, No lesions  NECK: Supple  NERVOUS SYSTEM:  Alert & Oriented X0, Good concentration; Motor Strength 3/5 B/L upper and lower extremities; DTRs 2+ intact and symmetric  CHEST/LUNG: Clear to percussion bilaterally; No rales, rhonchi, wheezing, or rubs  HEART: tachycardic, Afib; No murmurs, rubs, or gallops  ABDOMEN: Soft, Nontender, Nondistended; Bowel sounds present  EXTREMITIES:  2+ Peripheral Pulses, No clubbing, cyanosis, or edema    SKIN: multiple  decubuti    Labs                          13.2   6.19  )-----------( CLUMPED    ( 16 Mar 2022 07:31 )             41.6   03-16    141  |  113<H>  |  6<L>  ----------------------------<  103<H>  4.4   |  25  |  0.44<L>    Ca    8.5      16 Mar 2022 07:31  Phos  2.1     03-16  Mg     2.0     03-16                  Culture - Urine (collected 12 Mar 2022 18:42)  Source: Clean Catch Clean Catch (Midstream)  Final Report (15 Mar 2022 11:46):    >100,000 CFU/ml Proteus mirabilis  Organism: Proteus mirabilis (15 Mar 2022 11:46)  Organism: Proteus mirabilis (15 Mar 2022 11:46)

## 2022-03-16 NOTE — PROGRESS NOTE ADULT - PROBLEM SELECTOR PLAN 3
h/o dementia  likely in setting of UTI  supportive care
h/o dementia  seems more alert today  likely in setting of UTI  supportive care

## 2022-03-16 NOTE — PROGRESS NOTE ADULT - TIME BILLING
Evaluation of patient, review of medical records and collaboration with care teams.
Evaluation of patient, review of medical records and collaboration with care teams.

## 2022-03-16 NOTE — PROGRESS NOTE ADULT - PROBLEM SELECTOR PLAN 4
blood glucose monitoring before meals and bedtime  sliding scale insulin
blood glucose monitoring before meals and bedtime  sliding scale insulin

## 2022-03-16 NOTE — PROGRESS NOTE ADULT - PROBLEM SELECTOR PLAN 2
rates in the 160s today   received diltiazem IVP yesterday and today  on metoprolol 25mg 2 times daily  pending TTE  on tele monitoring
rate controlled today  s/p diltiazem IVP yesterday  on metoprolol 12.5mg 2 times daily  on maintenance IVF   TSH WNL  pending TTE results  on tele monitoring

## 2022-03-16 NOTE — PROGRESS NOTE ADULT - SUBJECTIVE AND OBJECTIVE BOX
SUBJECTIVE AND OBJECTIVE: Patient awake and alert  INTERVAL HPI/OVERNIGHT EVENTS:    DNR on chart: no  Allergies    No Known Allergies    Intolerances    MEDICATIONS  (STANDING):  cefTRIAXone   IVPB 1000 milliGRAM(s) IV Intermittent every 24 hours  dextrose 40% Gel 15 Gram(s) Oral once  dextrose 5%. 1000 milliLiter(s) (50 mL/Hr) IV Continuous <Continuous>  dextrose 5%. 1000 milliLiter(s) (100 mL/Hr) IV Continuous <Continuous>  dextrose 50% Injectable 25 Gram(s) IV Push once  dextrose 50% Injectable 12.5 Gram(s) IV Push once  dextrose 50% Injectable 25 Gram(s) IV Push once  glucagon  Injectable 1 milliGRAM(s) IntraMuscular once  heparin   Injectable 5000 Unit(s) SubCutaneous every 12 hours  influenza  Vaccine (HIGH DOSE) 0.7 milliLiter(s) IntraMuscular once  insulin lispro (ADMELOG) corrective regimen sliding scale   SubCutaneous three times a day before meals  insulin lispro (ADMELOG) corrective regimen sliding scale   SubCutaneous at bedtime  metoprolol tartrate 12.5 milliGRAM(s) Oral two times a day  nystatin Powder 1 Application(s) Topical every 8 hours  potassium phosphate / sodium phosphate Powder (PHOS-NaK) 1 Packet(s) Oral three times a day  sodium chloride 0.9%. 1000 milliLiter(s) (75 mL/Hr) IV Continuous <Continuous>    MEDICATIONS  (PRN):      ITEMS UNCHECKED ARE NOT PRESENT    PRESENT SYMPTOMS: [ ]Unable to obtain due to poor mentation   Source if other than patient:  [ ]Family   [ ]Team     Pain:  [ ]yes [x ]no  QOL impact -   Location -                    Aggravating factors -  Quality -  Radiation -  Timing-  Severity (0-10 scale):  Minimal acceptable level (0-10 scale):     Dyspnea:                           [ ]Mild [ ]Moderate [ ]Severe  Anxiety:                             [ ]Mild [ ]Moderate [ ]Severe  Fatigue:                             [ ]Mild [ ]Moderate [ ]Severe  Nausea:                             [ ]Mild [ ]Moderate [ ]Severe  Loss of appetite:              [ ]Mild [ ]Moderate [ ]Severe  Constipation:                    [ ]Mild [ ]Moderate [ ]Severe    PAIN AD Score:	0  [email or web address] (Ctrl + left click to view)    Other Symptoms:  [ ]All other review of systems negative     Palliative Performance Status Version 2:       20-30  %      http://npcrc.org/files/news/palliative_performance_scale_ppsv2.pdf  PHYSICAL EXAM:  Vital Signs Last 24 Hrs  T(C): 36.8 (16 Mar 2022 10:49), Max: 36.8 (16 Mar 2022 10:49)  T(F): 98.2 (16 Mar 2022 10:49), Max: 98.2 (16 Mar 2022 10:49)  HR: 61 (16 Mar 2022 10:49) (54 - 131)  BP: 118/65 (16 Mar 2022 10:49) (101/59 - 124/74)  BP(mean): 90 (16 Mar 2022 04:38) (90 - 90)  RR: 18 (16 Mar 2022 10:49) (17 - 18)  SpO2: 100% (16 Mar 2022 10:49) (99% - 100%) I&O's Summary    15 Mar 2022 07:01  -  16 Mar 2022 07:00  --------------------------------------------------------  IN: 900 mL / OUT: 425 mL / NET: 475 mL       GENERAL:  [x ]Alert  [ ]Oriented x   [ ]Lethargic  [ ]Cachexia  [ ]Unarousable  [x ]Verbal  [ ]Non-Verbal  Behavioral:   [ ]Anxiety  [ ]Delirium [ ]Agitation [ ]Other  HEENT:  [ ]Normal   [ ]Dry mouth   [ ]ET Tube/Trach  [ ]Oral lesions  PULMONARY:   [x ]Clear [ ]Tachypnea  [ ]Audible excessive secretions   [ ]Rhonchi        [ ]Right [ ]Left [ ]Bilateral  [ ]Crackles        [ ]Right [ ]Left [ ]Bilateral  [ ]Wheezing     [ ]Right [ ]Left [ ]Bilateral  [ x]Diminished BS [ ] Right [ ]Left [x ]Bilateral bases  CARDIOVASCULAR:    [ ]Regular [ ]Irregular [ ]Tachy  [ ]Yunior [ ]Murmur [ ]Other  GASTROINTESTINAL:  [x ]Soft  [ ]Distended   [ ]+BS  [ ]Non tender [ ]Tender  [ ]PEG [ ]OGT/ NGT   Last BM:  3/15/22  GENITOURINARY:  [ ]Normal [x ]Incontinent   [ ]Oliguria/Anuria   [ ]Gaspar  MUSCULOSKELETAL:   [ ]Normal   [ ]Weakness  [ x]Bed/Wheelchair bound [ ]Edema  NEUROLOGIC:   [ ]No focal deficits  [ x] Cognitive impairment  [ ] Dysphagia [ ]Dysarthria [ ] Paresis [ ]Other   SKIN:   [ ]Normal  [ ]Rash   [x ]Pressure ulcer(s) sacrum stage II, cocyx stage II, Left heel DTI [ ]y [ ]n present on admission    CRITICAL CARE:  [ ]Shock Present  [ ]Septic [ ]Cardiogenic [ ]Neurologic [ ]Hypovolemic  [ ]Vasopressors [ ]Inotropes  [ ]Respiratory failure present [ ]Mechanical Ventilation [ ]Non-invasive ventilatory support [ ]High-Flow  [ ]Acute  [ ]Chronic [ ]Hypoxic  [ ]Hypercarbic [ ]Other  [ ]Other organ failure     LABS:                        13.2   6.19  )-----------( CLUMPED    ( 16 Mar 2022 07:31 )             41.6   03-16    141  |  113<H>  |  6<L>  ----------------------------<  103<H>  4.4   |  25  |  0.44<L>    Ca    8.5      16 Mar 2022 07:31  Phos  2.1     03-16  Mg     2.0     03-16    Thyroid Stimulating Hormone, Serum (03.15.22 @ 12:10)    Thyroid Stimulating Hormone, Serum: 1.180 uU/mL    RADIOLOGY & ADDITIONAL STUDIES: none new    Protein Calorie Malnutrition Present: [ ]mild [ ]moderate [ ]severe [ ]underweight [ ]morbid obesity  https://www.andeal.org/vault/5090/web/files/ONC/Table_Clinical%20Characteristics%20to%20Document%20Malnutrition-White%20JV%20et%20al%202012.pdf    Height (cm): 165.1 (03-12-22 @ 09:29)  Weight (kg): 38.6 (03-12-22 @ 16:40)  BMI (kg/m2): 14.2 (03-12-22 @ 16:40)    [ ]PPSV2 < or = 30%  [ ]significant weight loss [ ]poor nutritional intake [ ]anasarca    [ ]Artificial Nutrition    REFERRALS:   [ ]Chaplaincy  [ ]Hospice  [ ]Child Life  [ ]Social Work  [ ]Case management [ ]Holistic Therapy     Goals of Care Document:     SUBJECTIVE AND OBJECTIVE: Patient awake and alert  INTERVAL HPI/OVERNIGHT EVENTS:    DNR on chart: no  Allergies    No Known Allergies    Intolerances    MEDICATIONS  (STANDING):  cefTRIAXone   IVPB 1000 milliGRAM(s) IV Intermittent every 24 hours  dextrose 40% Gel 15 Gram(s) Oral once  dextrose 5%. 1000 milliLiter(s) (50 mL/Hr) IV Continuous <Continuous>  dextrose 5%. 1000 milliLiter(s) (100 mL/Hr) IV Continuous <Continuous>  dextrose 50% Injectable 25 Gram(s) IV Push once  dextrose 50% Injectable 12.5 Gram(s) IV Push once  dextrose 50% Injectable 25 Gram(s) IV Push once  glucagon  Injectable 1 milliGRAM(s) IntraMuscular once  heparin   Injectable 5000 Unit(s) SubCutaneous every 12 hours  influenza  Vaccine (HIGH DOSE) 0.7 milliLiter(s) IntraMuscular once  insulin lispro (ADMELOG) corrective regimen sliding scale   SubCutaneous three times a day before meals  insulin lispro (ADMELOG) corrective regimen sliding scale   SubCutaneous at bedtime  metoprolol tartrate 12.5 milliGRAM(s) Oral two times a day  nystatin Powder 1 Application(s) Topical every 8 hours  potassium phosphate / sodium phosphate Powder (PHOS-NaK) 1 Packet(s) Oral three times a day  sodium chloride 0.9%. 1000 milliLiter(s) (75 mL/Hr) IV Continuous <Continuous>    MEDICATIONS  (PRN):      ITEMS UNCHECKED ARE NOT PRESENT    PRESENT SYMPTOMS: [ ]Unable to obtain due to poor mentation   Source if other than patient:  [ ]Family   [ ]Team     Pain:  [ ]yes [x ]no  QOL impact -   Location -                    Aggravating factors -  Quality -  Radiation -  Timing-  Severity (0-10 scale):  Minimal acceptable level (0-10 scale):     Dyspnea:                           [ ]Mild [ ]Moderate [ ]Severe  Anxiety:                             [ ]Mild [ ]Moderate [ ]Severe  Fatigue:                             [ ]Mild [ ]Moderate [ ]Severe  Nausea:                             [ ]Mild [ ]Moderate [ ]Severe  Loss of appetite:              [ ]Mild [ ]Moderate [ ]Severe  Constipation:                    [ ]Mild [ ]Moderate [ ]Severe    PAIN AD Score:	0  [email or web address] (Ctrl + left click to view)    Other Symptoms:  [ ]All other review of systems negative     Palliative Performance Status Version 2:       20-30  %      http://npcrc.org/files/news/palliative_performance_scale_ppsv2.pdf  PHYSICAL EXAM:  Vital Signs Last 24 Hrs  T(C): 36.8 (16 Mar 2022 10:49), Max: 36.8 (16 Mar 2022 10:49)  T(F): 98.2 (16 Mar 2022 10:49), Max: 98.2 (16 Mar 2022 10:49)  HR: 61 (16 Mar 2022 10:49) (54 - 131)  BP: 118/65 (16 Mar 2022 10:49) (101/59 - 124/74)  BP(mean): 90 (16 Mar 2022 04:38) (90 - 90)  RR: 18 (16 Mar 2022 10:49) (17 - 18)  SpO2: 100% (16 Mar 2022 10:49) (99% - 100%) I&O's Summary    15 Mar 2022 07:01  -  16 Mar 2022 07:00  --------------------------------------------------------  IN: 900 mL / OUT: 425 mL / NET: 475 mL       GENERAL:  [x ]Alert  [ ]Oriented x   [ ]Lethargic  [ ]Cachexia  [ ]Unarousable  [x ]Verbal  [ ]Non-Verbal  Behavioral:   [ ]Anxiety  [ ]Delirium [ ]Agitation [ ]Other  HEENT:  [ ]Normal   [ ]Dry mouth   [ ]ET Tube/Trach  [ ]Oral lesions  PULMONARY:   [x ]Clear [ ]Tachypnea  [ ]Audible excessive secretions   [ ]Rhonchi        [ ]Right [ ]Left [ ]Bilateral  [ ]Crackles        [ ]Right [ ]Left [ ]Bilateral  [ ]Wheezing     [ ]Right [ ]Left [ ]Bilateral  [ x]Diminished BS [ ] Right [ ]Left [x ]Bilateral bases  CARDIOVASCULAR:    [ ]Regular [ ]Irregular [ ]Tachy  [ ]Yunior [ ]Murmur [ ]Other  GASTROINTESTINAL:  [x ]Soft  [ ]Distended   [ ]+BS  [ ]Non tender [ ]Tender  [ ]PEG [ ]OGT/ NGT   Last BM:  3/15/22  GENITOURINARY:  [ ]Normal [x ]Incontinent   [ ]Oliguria/Anuria   [ ]Gaspar  MUSCULOSKELETAL:   [ ]Normal   [ ]Weakness  [ x]Bed/Wheelchair bound [ ]Edema  NEUROLOGIC:   [ ]No focal deficits  [ x] Cognitive impairment  [ ] Dysphagia [ ]Dysarthria [ ] Paresis [ ]Other   SKIN:   [ ]Normal  [ ]Rash   [x ]Pressure ulcer(s) sacrum stage II, cocyx stage II, Left heel DTI [ ]y [ ]n present on admission    CRITICAL CARE:  [ ]Shock Present  [ ]Septic [ ]Cardiogenic [ ]Neurologic [ ]Hypovolemic  [ ]Vasopressors [ ]Inotropes  [ ]Respiratory failure present [ ]Mechanical Ventilation [ ]Non-invasive ventilatory support [ ]High-Flow  [ ]Acute  [ ]Chronic [ ]Hypoxic  [ ]Hypercarbic [ ]Other  [ ]Other organ failure     LABS:                        13.2   6.19  )-----------( CLUMPED    ( 16 Mar 2022 07:31 )             41.6   03-16    141  |  113<H>  |  6<L>  ----------------------------<  103<H>  4.4   |  25  |  0.44<L>    Ca    8.5      16 Mar 2022 07:31  Phos  2.1     03-16  Mg     2.0     03-16    Thyroid Stimulating Hormone, Serum (03.15.22 @ 12:10)    Thyroid Stimulating Hormone, Serum: 1.180 uU/mL    RADIOLOGY & ADDITIONAL STUDIES: none new    Protein Calorie Malnutrition Present: [ ]mild [x ]moderate [ ]severe [ ]underweight [ ]morbid obesity  https://www.andeal.org/vault/2290/web/files/ONC/Table_Clinical%20Characteristics%20to%20Document%20Malnutrition-White%20JV%20et%20al%202012.pdf    Height (cm): 165.1 (03-12-22 @ 09:29)  Weight (kg): 38.6 (03-12-22 @ 16:40)  BMI (kg/m2): 14.2 (03-12-22 @ 16:40)    [ x]PPSV2 < or = 30%  [ ]significant weight loss [ x]poor nutritional intake [ ]anasarca    [ ]Artificial Nutrition    REFERRALS:   [ ]Chaplaincy  [ ]Hospice  [ ]Child Life  [ ]Social Work  [ ]Case management [ ]Holistic Therapy     Goals of Care Document:

## 2022-03-16 NOTE — PROGRESS NOTE ADULT - PROBLEM SELECTOR PLAN 7
albumin 2.8  low creatinine level likely from low protein stores  hypokalemia, hypophosphatemia, hypernatremia and hyperchloremic  requires assistance with eating
albumin 2.8  low creatinine level likely from low protein stores  hypophosphatemia and hyperchloremic  requires assistance with eating

## 2022-03-16 NOTE — PROGRESS NOTE ADULT - ASSESSMENT
81 year old female PMH DM, HLD, advanced dementia presented with altered mental status admitted for metabolic encephalopathy 2/2 UTI found to have to onset Afib.    New onset rapid afib on telemetry, Demand ischemia 2/2 dehydration-   afib  rate controlled    appreciate cardiology note       Metabolic encephalopathy 2/2 UTI and advanced dementia  Speech swallow study: passed  urine cultures: Proteus sensitive to ceftriaxone    Dehydration  LR bolus    Advanced dementia  Palliative care consult    Hyperglycemia 2/2 uncontrolled diabetes resolved       HyperNa+ resolved      Hospice- eval and placement in progress ...  DVT PPX  heparin    Wound care consult for sacral ulcers 81 year old female PMH DM, HLD, advanced dementia presented with altered mental status admitted for metabolic encephalopathy 2/2 UTI found to have to onset Afib.    New onset rapid afib on telemetry, Demand ischemia 2/2 dehydration-   afib  rate controlled    appreciate cardiology note       Metabolic encephalopathy 2/2 UTI and advanced dementia  Speech swallow study: passed  urine cultures: Proteus sensitive to ceftriaxone    Dehydration  LR bolus    Advanced dementia  Palliative care consult    Hyperglycemia 2/2 uncontrolled diabetes resolved       HyperNa+ resolved      multiple decubit will get wound consult      Hospice- eval and placement in progress ...  DVT PPX  heparin    Wound care consult for sacral ulcers

## 2022-03-16 NOTE — PROGRESS NOTE ADULT - SUBJECTIVE AND OBJECTIVE BOX
Patient is a 81y old  Female who presents with Lethargy at home with decreased PO intake. (16 Mar 2022 11:46)    PAST MEDICAL & SURGICAL HISTORY:  DM (diabetes mellitus)    HLD    Dementia    INTERVAL HISTORY: in no distress, awake, pleasantly confused   	  MEDICATIONS:  MEDICATIONS  (STANDING):  heparin   Injectable 5000 Unit(s) SubCutaneous every 12 hours  influenza  Vaccine (HIGH DOSE) 0.7 milliLiter(s) IntraMuscular once  insulin lispro (ADMELOG) corrective regimen sliding scale   SubCutaneous three times a day before meals  insulin lispro (ADMELOG) corrective regimen sliding scale   SubCutaneous at bedtime  metoprolol tartrate 12.5 milliGRAM(s) Oral two times a day  nystatin Powder 1 Application(s) Topical every 8 hours  potassium phosphate / sodium phosphate Powder (PHOS-NaK) 1 Packet(s) Oral three times a day  sodium chloride 0.9%. 1000 milliLiter(s) (75 mL/Hr) IV Continuous <Continuous>    Vitals:  T(F): 98.2 (03-16-22 @ 10:49), Max: 98.2 (03-16-22 @ 10:49)  HR: 61 (03-16-22 @ 10:49) (54 - 123)  BP: 118/65 (03-16-22 @ 10:49) (101/59 - 124/74)  RR: 18 (03-16-22 @ 10:49) (17 - 18)  SpO2: 100% (03-16-22 @ 10:49) (100% - 100%)    03-15 @ 07:01  -  03-16 @ 07:00  --------------------------------------------------------  IN:    sodium chloride 0.9%: 900 mL  Total IN: 900 mL    OUT:    Voided (mL): 425 mL  Total OUT: 425 mL    Total NET: 475 mL    PHYSICAL EXAM:  Neuro: Awake, responsive  CV: S1 S2 RRR  Lungs: diminished to bases   GI: Soft, BS +, ND, NT  Extremities: No edema    TELEMETRY: sinus with PACs    RADIOLOGY: < from: Xray Chest 1 View- PORTABLE-Urgent (Xray Chest 1 View- PORTABLE-Urgent .) (03.12.22 @ 11:03) >  Heart size is normal. Lungs show no acute infiltrate. No pleural effusion.    IMPRESSION:  No active parenchymal disease in the chest.    < end of copied text >    DIAGNOSTIC TESTING:    [x ] Echocardiogram: < from: TTE Echo Complete w/o Contrast w/ Doppler (03.15.22 @ 16:43) >  Left Ventricle: The left ventricular internal cavitysize is normal. Left   ventricular wall thickness is normal.  Global LV systolic function was moderately decreased. Left ventricular   ejection fraction, by visual estimation, is 35 to 40%. Spectral Doppler   shows impaired relaxation pattern of leftventricular myocardial filling   (Grade I diastolic dysfunction). Findings are consistent with global   cardiomyopathy.      LV Wall Scoring:  The mid and apical inferior wall is hypokinetic.    Right Ventricle: Normal right ventricular size and function.  Left Atrium: Normal left atrial size.  Right Atrium: Normal right atrial size.  Pericardium: There is no evidence of pericardial effusion.  Mitral Valve: Mild thickening and calcification of the anterior and   posterior mitral valve leaflets. Mitral leaflet mobility is normal. Mild   to moderate mitral valve regurgitation is seen.  Tricuspid Valve: The tricuspid valve is normal in structure. Moderate   tricuspid regurgitation is visualized.  Aortic Valve: The aortic valve is trileaflet. Trivial aortic valve   regurgitation is seen.  Pulmonic Valve: The pulmonic valve was not well visualized. The pulmonic   valve is normal. Mild pulmonic valve regurgitation.  Aorta: Aortic root measured at Sinus of Valsalva is normal.  Venous: The inferior vena cava was normal sized, with respiratory size   variation greater than 50%.  In comparison to the previous echocardiogram(s): There are no prior   studies on this patient for comparison purposes.      Summary:   1. Left ventricular ejection fraction, by visual estimation, is 35 to   40%.   2. Technically fair study.   3. Moderately decreased global left ventricular systolic function.   4. Mid and apical inferior wall is abnormal as described above.   5. Global cardiomyopathy.   6. Normal left ventricular internal cavity size.   7. Spectral Doppler shows impaired relaxation pattern of left   ventricular myocardial filling (Grade I diastolic dysfunction).   8. Normal right ventricular size and function.   9. Normal left atrial size.  10. Normal right atrial size.  11. There is no evidence of pericardial effusion.  12. Mild thickening and calcification of the anterior and posterior   mitral valve leaflets.  13. Mild to moderate mitral valve regurgitation.  14. Moderate tricuspid regurgitation.  15. Mild pulmonic valve regurgitation.    < end of copied text >    LABS:	 	    16 Mar 2022 07:31    141    |  113    |  6      ----------------------------<  103    4.4     |  25     |  0.44   15 Mar 2022 07:40    148    |  117    |  10     ----------------------------<  82     3.0     |  26     |  0.39   14 Mar 2022 17:54    148    |  118    |  13     ----------------------------<  157    3.4     |  27     |  0.41     Ca    8.5        16 Mar 2022 07:31  Phos  2.1       16 Mar 2022 07:31  Mg     2.0       16 Mar 2022 07:31                        13.2   6.19  )-----------( CLUMPED    ( 16 Mar 2022 07:31 )             41.6 ,                       11.7   6.15  )-----------( 166      ( 15 Mar 2022 12:10 )             35.7   Lipid Profile: 03-16 @ 11:14  HDL/Total Cholesterol: --  HDL Chol:              41 mg/dL  Serum Chol:            131 mg/dL  Direct LDL:            --  Triglycerides:         101 mg/dL    TSH: Thyroid Stimulating Hormone, Serum: 1.180 uU/mL (03-15 @ 12:10)

## 2022-03-17 LAB
GLUCOSE BLDC GLUCOMTR-MCNC: 133 MG/DL — HIGH (ref 70–99)
GLUCOSE BLDC GLUCOMTR-MCNC: 207 MG/DL — HIGH (ref 70–99)
GLUCOSE BLDC GLUCOMTR-MCNC: 217 MG/DL — HIGH (ref 70–99)
GLUCOSE BLDC GLUCOMTR-MCNC: 231 MG/DL — HIGH (ref 70–99)
HCT VFR BLD CALC: 38.8 % — SIGNIFICANT CHANGE UP (ref 34.5–45)
HGB BLD-MCNC: 12.5 G/DL — SIGNIFICANT CHANGE UP (ref 11.5–15.5)
MCHC RBC-ENTMCNC: 28.9 PG — SIGNIFICANT CHANGE UP (ref 27–34)
MCHC RBC-ENTMCNC: 32.2 G/DL — SIGNIFICANT CHANGE UP (ref 32–36)
MCV RBC AUTO: 89.8 FL — SIGNIFICANT CHANGE UP (ref 80–100)
NRBC # BLD: 0 /100 WBCS — SIGNIFICANT CHANGE UP (ref 0–0)
PLATELET # BLD AUTO: 195 K/UL — SIGNIFICANT CHANGE UP (ref 150–400)
RBC # BLD: 4.32 M/UL — SIGNIFICANT CHANGE UP (ref 3.8–5.2)
RBC # FLD: 14.6 % — HIGH (ref 10.3–14.5)
WBC # BLD: 5.64 K/UL — SIGNIFICANT CHANGE UP (ref 3.8–10.5)
WBC # FLD AUTO: 5.64 K/UL — SIGNIFICANT CHANGE UP (ref 3.8–10.5)

## 2022-03-17 PROCEDURE — 99233 SBSQ HOSP IP/OBS HIGH 50: CPT

## 2022-03-17 PROCEDURE — 99232 SBSQ HOSP IP/OBS MODERATE 35: CPT

## 2022-03-17 RX ORDER — METOPROLOL TARTRATE 50 MG
5 TABLET ORAL ONCE
Refills: 0 | Status: COMPLETED | OUTPATIENT
Start: 2022-03-17 | End: 2022-03-17

## 2022-03-17 RX ORDER — METOPROLOL TARTRATE 50 MG
25 TABLET ORAL
Refills: 0 | Status: DISCONTINUED | OUTPATIENT
Start: 2022-03-17 | End: 2022-03-21

## 2022-03-17 RX ORDER — COLLAGENASE CLOSTRIDIUM HIST. 250 UNIT/G
1 OINTMENT (GRAM) TOPICAL DAILY
Refills: 0 | Status: DISCONTINUED | OUTPATIENT
Start: 2022-03-17 | End: 2022-03-22

## 2022-03-17 RX ADMIN — HEPARIN SODIUM 5000 UNIT(S): 5000 INJECTION INTRAVENOUS; SUBCUTANEOUS at 06:19

## 2022-03-17 RX ADMIN — Medication 5 MILLIGRAM(S): at 14:35

## 2022-03-17 RX ADMIN — Medication 1 PACKET(S): at 14:35

## 2022-03-17 RX ADMIN — NYSTATIN CREAM 1 APPLICATION(S): 100000 CREAM TOPICAL at 14:37

## 2022-03-17 RX ADMIN — Medication 4: at 17:39

## 2022-03-17 RX ADMIN — Medication 81 MILLIGRAM(S): at 12:13

## 2022-03-17 RX ADMIN — ATORVASTATIN CALCIUM 20 MILLIGRAM(S): 80 TABLET, FILM COATED ORAL at 21:20

## 2022-03-17 RX ADMIN — Medication 4: at 12:13

## 2022-03-17 RX ADMIN — NYSTATIN CREAM 1 APPLICATION(S): 100000 CREAM TOPICAL at 21:21

## 2022-03-17 RX ADMIN — NYSTATIN CREAM 1 APPLICATION(S): 100000 CREAM TOPICAL at 06:20

## 2022-03-17 RX ADMIN — Medication 25 MILLIGRAM(S): at 17:40

## 2022-03-17 RX ADMIN — Medication 12.5 MILLIGRAM(S): at 06:19

## 2022-03-17 RX ADMIN — Medication 1 PACKET(S): at 06:19

## 2022-03-17 RX ADMIN — HEPARIN SODIUM 5000 UNIT(S): 5000 INJECTION INTRAVENOUS; SUBCUTANEOUS at 17:39

## 2022-03-17 NOTE — PROGRESS NOTE ADULT - ASSESSMENT
81 year old female PMH DM, HLD, advanced dementia presented with altered mental status admitted for metabolic encephalopathy 2/2 UTI found to have to onset Afib.    New onset rapid afib on telemetry, Demand ischemia 2/2 dehydration-   afib  rate controlled    appreciate cardiology note   3/17/2022 d/w telemetry tech no afib patient  in fact has sinus arrythmia       Metabolic encephalopathy 2/2 UTI and advanced dementia  Speech swallow study: passed  urine cultures: Proteus sensitive to ceftriaxone    Dehydration  LR bolus    Advanced dementia  Palliative care consult    Hyperglycemia 2/2 uncontrolled diabetes resolved       HyperNa+ resolved      multiple decubit will get wound consult      Hospice- eval and placement in progress ...    DVT PPX  heparin     81 year old female PMH DM, HLD, advanced dementia presented with altered mental status admitted for metabolic encephalopathy 2/2 UTI found to have to onset Afib.    New onset rapid afib on telemetry, Demand ischemia 2/2 dehydration-   afib  rate controlled    appreciate cardiology note   3/17/2022 d/w telemetry tech no afib patient  in fact has sinus arrythmia       Metabolic encephalopathy 2/2 UTI and advanced dementia  Speech swallow study: passed  urine cultures: Proteus sensitive to ceftriaxone    Dehydration  LR bolus    Advanced dementia  Palliative care consult    Hyperglycemia 2/2 uncontrolled diabetes resolved       HyperNa+ resolved      multiple decubit will get wound consult      Hospice- note from 3/17/2022 reviewed placement will be difficult as its just the  and wife and he is unable to take care of her and home Hospice will likely be unsafe as he is the only caregiver.  eval and placement in progress ...    DVT PPX  heparin

## 2022-03-17 NOTE — PROGRESS NOTE ADULT - SUBJECTIVE AND OBJECTIVE BOX
Patient is a 81y old  Female who presents with Lethargy at home with decreased PO intake. (17 Mar 2022 11:26)    PAST MEDICAL & SURGICAL HISTORY:  DM (diabetes mellitus)    HLD    Dementia    INTERVAL HISTORY: in no acute distress, denies any chest pain or sob, pleasantly confused    	  MEDICATIONS:  MEDICATIONS  (STANDING):  aspirin  chewable 81 milliGRAM(s) Oral daily  atorvastatin 20 milliGRAM(s) Oral at bedtime  heparin   Injectable 5000 Unit(s) SubCutaneous every 12 hours  influenza  Vaccine (HIGH DOSE) 0.7 milliLiter(s) IntraMuscular once  insulin lispro (ADMELOG) corrective regimen sliding scale   SubCutaneous three times a day before meals  insulin lispro (ADMELOG) corrective regimen sliding scale   SubCutaneous at bedtime  metoprolol tartrate 12.5 milliGRAM(s) Oral two times a day  nystatin Powder 1 Application(s) Topical every 8 hours    Vitals:  T(F): 98.2 (03-17-22 @ 11:27), Max: 98.2 (03-17-22 @ 11:27)  HR: 76 (03-17-22 @ 11:27) (70 - 85)  BP: 121/57 (03-17-22 @ 11:27) (102/62 - 121/57)  RR: 16 (03-17-22 @ 11:27) (16 - 18)  SpO2: 94% (03-17-22 @ 11:27) (94% - 100%)    03-16 @ 07:01  -  03-17 @ 07:00  --------------------------------------------------------  IN:    Oral Fluid: 340 mL  Total IN: 340 mL    OUT:  Total OUT: 0 mL    Total NET: 340 mL    03-17 @ 07:01  -  03-17 @ 14:10  --------------------------------------------------------  IN:    Oral Fluid: 480 mL  Total IN: 480 mL    OUT:  Total OUT: 0 mL    Total NET: 480 mL    PHYSICAL EXAM:  Neuro: Awake, responsive  CV: S1 S2 RRR + SM  Lungs: CTABL  GI: Soft, BS +, ND, NT  Extremities: No edema    TELEMETRY: sinus with PACs and few PVCs    RADIOLOGY: < from: Xray Chest 1 View- PORTABLE-Urgent (Xray Chest 1 View- PORTABLE-Urgent .) (03.12.22 @ 11:03) >  No active parenchymal disease in the chest.    < end of copied text >    DIAGNOSTIC TESTING:    [x ] Echocardiogram: < from: TTE Echo Complete w/o Contrast w/ Doppler (03.15.22 @ 16:43) >  Left Ventricle: The left ventricular internal cavitysize is normal. Left   ventricular wall thickness is normal.  Global LV systolic function was moderately decreased. Left ventricular   ejection fraction, by visual estimation, is 35 to 40%. Spectral Doppler   shows impaired relaxation pattern of leftventricular myocardial filling   (Grade I diastolic dysfunction). Findings are consistent with global   cardiomyopathy.      LV Wall Scoring:  The mid and apical inferior wall is hypokinetic.    Right Ventricle: Normal right ventricular size and function.  Left Atrium: Normal left atrial size.  Right Atrium: Normal right atrial size.  Pericardium: There is no evidence of pericardial effusion.  Mitral Valve: Mild thickening and calcification of the anterior and   posterior mitral valve leaflets. Mitral leaflet mobility is normal. Mild   to moderate mitral valve regurgitation is seen.  Tricuspid Valve: The tricuspid valve is normal in structure. Moderate   tricuspid regurgitation is visualized.  Aortic Valve: The aortic valve is trileaflet. Trivial aortic valve   regurgitation is seen.  Pulmonic Valve: The pulmonic valve was not well visualized. The pulmonic   valve is normal. Mild pulmonic valve regurgitation.  Aorta: Aortic root measured at Sinus of Valsalva is normal.  Venous: The inferior vena cava was normal sized, with respiratory size   variation greater than 50%.  In comparison to the previous echocardiogram(s): There are no prior   studies on this patient for comparison purposes.      Summary:   1. Left ventricular ejection fraction, by visual estimation, is 35 to   40%.   2. Technically fair study.   3. Moderately decreased global left ventricular systolic function.   4. Mid and apical inferior wall is abnormal as described above.   5. Global cardiomyopathy.   6. Normal left ventricular internal cavity size.   7. Spectral Doppler shows impaired relaxation pattern of left   ventricular myocardial filling (Grade I diastolic dysfunction).   8. Normal right ventricular size and function.   9. Normal left atrial size.  10. Normal right atrial size.  11. There is no evidence of pericardial effusion.  12. Mild thickening and calcification of the anterior and posterior   mitral valve leaflets.  13. Mild to moderate mitral valve regurgitation.  14. Moderate tricuspid regurgitation.  15. Mild pulmonic valve regurgitation.    < end of copied text >    LABS:	 	    16 Mar 2022 07:31    141    |  113    |  6      ----------------------------<  103    4.4     |  25     |  0.44   15 Mar 2022 07:40    148    |  117    |  10     ----------------------------<  82     3.0     |  26     |  0.39   14 Mar 2022 17:54    148    |  118    |  13     ----------------------------<  157    3.4     |  27     |  0.41     Ca    8.5        16 Mar 2022 07:31  Phos  2.1       16 Mar 2022 07:31  Mg     2.0       16 Mar 2022 07:31                        12.5   5.64  )-----------( 195      ( 17 Mar 2022 07:12 )             38.8 ,                       13.2   6.19  )-----------( CLUMPED    ( 16 Mar 2022 07:31 )             41.6 ,                       11.7   6.15  )-----------( 166      ( 15 Mar 2022 12:10 )             35.7     Lipid Profile: 03-16 @ 11:14  HDL/Total Cholesterol: --  HDL Chol:              41 mg/dL  Serum Chol:            131 mg/dL  Direct LDL:            --  Triglycerides:         101 mg/dL    TSH: Thyroid Stimulating Hormone, Serum: 1.180 uU/mL (03-15 @ 12:10)

## 2022-03-17 NOTE — PHYSICAL THERAPY INITIAL EVALUATION ADULT - PRECAUTIONS/LIMITATIONS, REHAB EVAL
presents with strength deficits, difficulty following directions, fall risk/fall precautions
presents with strength deficits, difficulty following directions, fall risk/fall precautions

## 2022-03-17 NOTE — PHYSICAL THERAPY INITIAL EVALUATION ADULT - DIAGNOSIS, PT EVAL
Pt presents with strength and endurance deficits, difficulty in bed mobility, transfers and ambulation, fall risk due to poor gait balance.  impaired integumentary integrity
Pt presents with strength and endurance deficits, difficulty in bed mobility, transfers and ambulation, fall risk due to poor gait balance.

## 2022-03-17 NOTE — PHYSICAL THERAPY INITIAL EVALUATION ADULT - LIVES WITH, PROFILE
Pt with difficulty remembering, poor historian,  as per medical records pt lives with , more information to be gathered from family members (PT called  Vincent Finch at [phone] but busy ringing)
Pt with difficulty remembering, poor historian,  as per medical records pt lives with , more information to be gathered from family members (PT called  Vincent Finch at [phone] but busy ringing)

## 2022-03-17 NOTE — PHYSICAL THERAPY INITIAL EVALUATION ADULT - GENERAL OBSERVATIONS, REHAB EVAL
Pt was seen in semi-supine and just finished her breakfast, alert but confused and unable to follow command. L Total cair boot donned. Pt was restless and refused vital monitoring. Pt needed max assist in bed mobility. P.T. wound care initial evaluation performed with all wounds were cleaned, dressing applied and documented in flowsheet A&I  and c recommendation in flowsheet plan of care.
Pt is awake, not in distress, on room air, confused, disoriented.

## 2022-03-17 NOTE — PHYSICAL THERAPY INITIAL EVALUATION ADULT - PATIENT PROFILE REVIEW, REHAB EVAL
Subjective:      Patient ID: Jillian Diaz is a 64 y.o. male. CC: SOB    This is a 65 yo male known to our service. He is being followed for AS, ICM, CAD. He is here to re-discuss surgery now that he has had GABRIELE. Cardiac MRI was ordered but we were told he was \" too large\" to get the scan. His past medical history includes hypertension. He was initially seen on 6/22/2021 upon consultation from cardiology during a recent hospital admission for cardiomyopathy after complaints of progressively worsening leg swelling and shortness of breath. He was found to be in CHF and was [place] was found to have severe AS (bicuspid AV with YESI 0.8 and mean gradient 30mm Hg) and heart cath was done showing CAD.  Currently he denies current CP, SOB, HAMILTON, increasing LE edema, N/V, F/C, orthopnea, PND and syncope. Dental clearance has been obtained on 6/23/21. GABRIELE confirms severe low flow low gradient AS along with LVEF 20-25% and reduced Right ventricle global systolic function. HPI    Review of Systems   Constitutional: Positive for fatigue. Negative for chills and fever. Respiratory: Negative for cough. Chronic HAMILTON   Cardiovascular: Negative for chest pain and palpitations. Gastrointestinal: Negative for abdominal pain and constipation. Neurological: Negative for syncope and light-headedness. Objective:   Physical Exam  Constitutional:       General: He is not in acute distress. Cardiovascular:      Rate and Rhythm: Normal rate and regular rhythm. Heart sounds: Murmur heard. Pulmonary:      Effort: Pulmonary effort is normal. No respiratory distress. Abdominal:      Palpations: Abdomen is soft. Tenderness: There is no guarding. Musculoskeletal:      Comments: bilat LE edema   Skin:     General: Skin is warm and dry. Neurological:      General: No focal deficit present. Mental Status: He is alert and oriented to person, place, and time.    Psychiatric:         Mood and
yes
yes

## 2022-03-17 NOTE — PROGRESS NOTE ADULT - SUBJECTIVE AND OBJECTIVE BOX
Patient is a 81y old  Female who presents with a chief complaint of Lethargy at home with decreased PO intake. (14 Mar 2022 15:30)    INTERVAL HPI/OVERNIGHT EVENTS: Patients seen and examined at bedside this morning. No acute events overnight. Pt reports wanting water. Seen at bedside with  at 11:45 am (Stef). telephone number verified [phone]. As per , they have been  for 50+ years. She has advanced stage dementia for several years now currently bedridden. Full assist. Only eats yoghurt and ice cream at times to maintain nutrition. Lives with  and mentally challenged son (40s).  takes care of wife and son with no help. They have 7 other children who don't assist or stop by to visit. Patient wants to be home. Denies CP.   MEDICATIONS  (STANDING):  aspirin  chewable 81 milliGRAM(s) Oral daily  atorvastatin 20 milliGRAM(s) Oral at bedtime  dextrose 40% Gel 15 Gram(s) Oral once  dextrose 5%. 1000 milliLiter(s) (100 mL/Hr) IV Continuous <Continuous>  dextrose 5%. 1000 milliLiter(s) (50 mL/Hr) IV Continuous <Continuous>  dextrose 50% Injectable 25 Gram(s) IV Push once  dextrose 50% Injectable 12.5 Gram(s) IV Push once  dextrose 50% Injectable 25 Gram(s) IV Push once  glucagon  Injectable 1 milliGRAM(s) IntraMuscular once  heparin   Injectable 5000 Unit(s) SubCutaneous every 12 hours  influenza  Vaccine (HIGH DOSE) 0.7 milliLiter(s) IntraMuscular once  insulin lispro (ADMELOG) corrective regimen sliding scale   SubCutaneous three times a day before meals  insulin lispro (ADMELOG) corrective regimen sliding scale   SubCutaneous at bedtime  metoprolol tartrate 12.5 milliGRAM(s) Oral two times a day  nystatin Powder 1 Application(s) Topical every 8 hours  potassium phosphate / sodium phosphate Powder (PHOS-NaK) 1 Packet(s) Oral three times a day    MEDICATIONS  (PRN):  Allergies    No Known Allergies    Intolerances    Vital Signs Last 24 Hrs  T(C): 36.5 (17 Mar 2022 04:50), Max: 36.5 (17 Mar 2022 04:50)  T(F): 97.7 (17 Mar 2022 04:50), Max: 97.7 (17 Mar 2022 04:50)  HR: 70 (17 Mar 2022 04:50) (70 - 85)  BP: 117/52 (17 Mar 2022 04:50) (102/62 - 117/52)  BP(mean): 74 (17 Mar 2022 04:50) (74 - 75)  RR: 17 (17 Mar 2022 04:50) (17 - 18)  SpO2: 100% (17 Mar 2022 04:50) (98% - 100%)    PHYSICAL EXAM:  GENERAL: NAD, chronically ill appearing  HEAD:  Atraumatic, Normocephalic  EYES: EOMI, PERRLA, conjunctiva and sclera clear  ENMT: No tonsillar erythema, exudates, or enlargement; dry mucous membranes No lesions  NECK: Supple  NERVOUS SYSTEM:  Alert & Oriented X0,  Motor Strength 3/5 B/L upper and lower extremities;  CHEST/LUNG: Clear to percussion bilaterally; No rales, rhonchi, wheezing, or rubs  HEART: S1 s2  No murmurs, rubs, or gallops  ABDOMEN: Soft, Nontender, Nondistended; Bowel sounds present  EXTREMITIES:  2+ Peripheral Pulses, No clubbing, cyanosis, or edema    SKIN: multiple  decubiti    Labs                                   12.5   5.64  )-----------( 195      ( 17 Mar 2022 07:12 )             38.8   03-16    141  |  113<H>  |  6<L>  ----------------------------<  103<H>  4.4   |  25  |  0.44<L>    Ca    8.5      16 Mar 2022 07:31  Phos  2.1     03-16  Mg     2.0     03-16            Culture - Urine (collected 12 Mar 2022 18:42)  Source: Clean Catch Clean Catch (Midstream)  Final Report (15 Mar 2022 11:46):    >100,000 CFU/ml Proteus mirabilis  Organism: Proteus mirabilis (15 Mar 2022 11:46)  Organism: Proteus mirabilis (15 Mar 2022 11:46)

## 2022-03-17 NOTE — PHYSICAL THERAPY INITIAL EVALUATION ADULT - ORIENTATION, REHAB EVAL
person
not oriented to person, place, time or situation
You can access the FollowMyHealth Patient Portal offered by [facility] by registering at the following website: [email or web address]. By joining [facility]’s FollowMyHealth portal, you will also be able to view your health information using other applications (apps) compatible with our system.

## 2022-03-17 NOTE — HOSPICE CARE NOTE - CONVESATION DETAILS
Called to patient's spouse Vincent Finch this am to discuss home hospice plan of care and services.  Vincent stated he understood, but when further discussed, seemed he was not following or having hard time understanding.  He stated several times that he would like the patient home, but he did not know if he could care for her.   stated he had little help from his other children, and is the sole caregiver to patient.  He seemed overwhelmed, yet eager to have spouse home.  Called to daughter Winsome at request of .  Discussed at length home hospice services and plan of care.   Reviewed inpatient hospice criteria, and Winsome made aware that currently the patient is home hospice appropriate and does not meet inpatient criteria.   Winsome stated she does not think her father will be able to care for patient, she stated he will not move her or change her.  As per Winsome, her father has been doing his best, but cannot provide total care for patient.  As per Winsome, her father has difficulty remembering medications for patient.  Asked if her father has any cognitive difficulties she stated "no, he's just old and set in his ways".  Winsome stated her father was 81yo as well.  Discharge home with hospice services is not safe plan unless the family is to private hire.  Discussed with Winsome the family may need to private hire - she stated her brother  had started the medicaid process, but had not sought to complete it.  Winsome made aware the process is lenghty, and depending on her mother's prognosis, they may not want to pursue.  Offered to email consents, she stated she would need to speak with her father.

## 2022-03-17 NOTE — PROGRESS NOTE ADULT - ASSESSMENT
82 yo female with hx of DM, HLD, dementia  admitted for altered mental status likely in the setting of acute UTI.  + UA, Culture - Urine (03.12.22 @ 18:42)>100,000 CFU/ml Proteus mirabilis  Appears fairly dehydrated clinically on admit, + hypernatremia   New "atrial fibrillation" RVR, appears to be sinus with APC's, also noted to have short runs of NSVTs 3/15  HR now much improved on bbl, remains in sinus with frequent PACs and occasional PVCs   TTE with Ef 35-40%, The mid and apical inferior wall is hypokinetic, Grade I DD, mild-mod MR, mod TR, mild LA    -Cont on metoprolol, ventricular arrhthymias now improved, titrate up bbl as BP and HR tolerate, can be switched Toprol prior to discharge, will add low dose ACE-I/ARB if BP still allows in setting of cardiomyopathy    -Restarted statin and added asa 81-> monitor platelets   -TSH 1.180  -s/p antibiotics Tx for UTI  -Hypernatremia now resolved with IV hydration  -Not a candidate for aggressive interventions due to baseline dementia/debility  -palliative care following, pending hospice referral

## 2022-03-17 NOTE — PHYSICAL THERAPY INITIAL EVALUATION ADULT - ADDITIONAL COMMENTS
as per medical records pt is capable of ambulation with supervision /assist   *details to be gathered from family members
as per medical records pt is capable of ambulation with supervision /assist   *details to be gathered from family members

## 2022-03-17 NOTE — PHYSICAL THERAPY INITIAL EVALUATION ADULT - PERTINENT HX OF CURRENT PROBLEM, REHAB EVAL
As per medical records pt observed by family member to be not in her usual self- confused, weak, difficulty in ambulation.
As per medical records pt observed by family member to be not in her usual self- confused, weak, difficulty in ambulation.

## 2022-03-18 LAB
GLUCOSE BLDC GLUCOMTR-MCNC: 148 MG/DL — HIGH (ref 70–99)
GLUCOSE BLDC GLUCOMTR-MCNC: 159 MG/DL — HIGH (ref 70–99)
GLUCOSE BLDC GLUCOMTR-MCNC: 192 MG/DL — HIGH (ref 70–99)
GLUCOSE BLDC GLUCOMTR-MCNC: 249 MG/DL — HIGH (ref 70–99)
GLUCOSE BLDC GLUCOMTR-MCNC: 265 MG/DL — HIGH (ref 70–99)

## 2022-03-18 PROCEDURE — 99232 SBSQ HOSP IP/OBS MODERATE 35: CPT

## 2022-03-18 RX ADMIN — Medication 1 APPLICATION(S): at 11:19

## 2022-03-18 RX ADMIN — Medication 4: at 18:15

## 2022-03-18 RX ADMIN — NYSTATIN CREAM 1 APPLICATION(S): 100000 CREAM TOPICAL at 15:17

## 2022-03-18 RX ADMIN — NYSTATIN CREAM 1 APPLICATION(S): 100000 CREAM TOPICAL at 21:36

## 2022-03-18 RX ADMIN — Medication 25 MILLIGRAM(S): at 17:26

## 2022-03-18 RX ADMIN — Medication 2: at 11:18

## 2022-03-18 RX ADMIN — Medication 2: at 08:05

## 2022-03-18 RX ADMIN — HEPARIN SODIUM 5000 UNIT(S): 5000 INJECTION INTRAVENOUS; SUBCUTANEOUS at 18:15

## 2022-03-18 RX ADMIN — Medication 25 MILLIGRAM(S): at 06:53

## 2022-03-18 RX ADMIN — HEPARIN SODIUM 5000 UNIT(S): 5000 INJECTION INTRAVENOUS; SUBCUTANEOUS at 05:04

## 2022-03-18 RX ADMIN — ATORVASTATIN CALCIUM 20 MILLIGRAM(S): 80 TABLET, FILM COATED ORAL at 21:36

## 2022-03-18 RX ADMIN — Medication 81 MILLIGRAM(S): at 11:19

## 2022-03-18 RX ADMIN — NYSTATIN CREAM 1 APPLICATION(S): 100000 CREAM TOPICAL at 05:05

## 2022-03-18 NOTE — PROGRESS NOTE ADULT - SUBJECTIVE AND OBJECTIVE BOX
Patient is a 81y old  Female who presents with a chief complaint of Lethargy at home with decreased PO intake. (14 Mar 2022 15:30)    INTERVAL HPI/ Patients seen and examined at bedside this morning. No acute events overnight. Pt reports wanting water. Seen at bedside with  at 11:45 am (Stef). telephone number verified [phone]. As per , they have been  for 50+ years. She has advanced stage dementia for several years now currently bedridden. Full assist. Only eats yoghurt and ice cream at times to maintain nutrition. Lives with  and mentally challenged son (40s).  takes care of wife and son with no help. They have 7 other children who don't assist or stop by to visit. Patient wants to be home. Denies CP.     Overnight: was having svt -required iv push lopressor  MEDICATIONS  (STANDING):  aspirin  chewable 81 milliGRAM(s) Oral daily  atorvastatin 20 milliGRAM(s) Oral at bedtime  collagenase Ointment 1 Application(s) Topical daily  dextrose 40% Gel 15 Gram(s) Oral once  dextrose 5%. 1000 milliLiter(s) (50 mL/Hr) IV Continuous <Continuous>  dextrose 5%. 1000 milliLiter(s) (100 mL/Hr) IV Continuous <Continuous>  dextrose 50% Injectable 25 Gram(s) IV Push once  dextrose 50% Injectable 12.5 Gram(s) IV Push once  dextrose 50% Injectable 25 Gram(s) IV Push once  glucagon  Injectable 1 milliGRAM(s) IntraMuscular once  heparin   Injectable 5000 Unit(s) SubCutaneous every 12 hours  influenza  Vaccine (HIGH DOSE) 0.7 milliLiter(s) IntraMuscular once  insulin lispro (ADMELOG) corrective regimen sliding scale   SubCutaneous three times a day before meals  insulin lispro (ADMELOG) corrective regimen sliding scale   SubCutaneous at bedtime  metoprolol tartrate 25 milliGRAM(s) Oral two times a day  nystatin Powder 1 Application(s) Topical every 8 hours    Allergies    No Known Allergies    Intolerances  Vital Signs Last 24 Hrs  T(C): 36.5 (18 Mar 2022 05:14), Max: 37.1 (18 Mar 2022 00:12)  T(F): 97.7 (18 Mar 2022 05:14), Max: 98.7 (18 Mar 2022 00:12)  HR: 64 (18 Mar 2022 05:14) (64 - 126)  BP: 132/78 (18 Mar 2022 05:14) (91/76 - 135/100)  BP(mean): --  RR: 18 (18 Mar 2022 05:14) (16 - 18)  SpO2: 97% (18 Mar 2022 05:14) (94% - 98%)    PHYSICAL EXAM:  GENERAL: NAD, chronically ill appearing  HEAD:  Atraumatic, Normocephalic  EYES: EOMI, PERRLA, conjunctiva and sclera clear  ENMT: No tonsillar erythema, exudates, or enlargement; dry mucous membranes No lesions  NECK: Supple  NERVOUS SYSTEM:  Alert & Oriented X0,  Motor Strength 3/5 B/L upper and lower extremities;  CHEST/LUNG: Clear to percussion bilaterally; No rales, rhonchi, wheezing, or rubs  HEART: S1 s2  No murmurs, rubs, or gallops  ABDOMEN: Soft, Nontender, Nondistended; Bowel sounds present  EXTREMITIES:  2+ Peripheral Pulses, No clubbing, cyanosis, or edema    SKIN: multiple  decubiti    Labs                                   12.5   5.64  )-----------( 195      ( 17 Mar 2022 07:12 )             38.8   03-16    141  |  113<H>  |  6<L>  ----------------------------<  103<H>  4.4   |  25  |  0.44<L>    Ca    8.5      16 Mar 2022 07:31  Phos  2.1     03-16  Mg     2.0     03-16            Culture - Urine (collected 12 Mar 2022 18:42)  Source: Clean Catch Clean Catch (Midstream)  Final Report (15 Mar 2022 11:46):    >100,000 CFU/ml Proteus mirabilis  Organism: Proteus mirabilis (15 Mar 2022 11:46)  Organism: Proteus mirabilis (15 Mar 2022 11:46)

## 2022-03-18 NOTE — PROGRESS NOTE ADULT - ASSESSMENT
81 year old female PMH DM, HLD, advanced dementia presented with altered mental status admitted for metabolic encephalopathy 2/2 UTI found to have to onset Afib.    New onset rapid afib on telemetry, Demand ischemia 2/2 dehydration-   afib  rate controlled    appreciate cardiology note   3/17/2022 d/w telemetry tech no afib patient  in fact has sinus arrythmia   3/18/2022 yesterday required IV push lopressor for elevated heart rate. appreciate cardiology nte beta blocker was increased will monitor.       Metabolic encephalopathy 2/2 UTI and advanced dementia  Speech swallow study: passed  urine cultures: Proteus sensitive to ceftriaxone    Dehydration  LR bolus    Advanced dementia  Palliative care consult    Hyperglycemia 2/2 uncontrolled diabetes resolved       HyperNa+ resolved      multiple decubit will get wound consult      Hospice- note from 3/17/2022 reviewed placement will be difficult as its just the  and wife and he is unable to take care of her and home Hospice will likely be unsafe as he is the only caregiver.  eval and placement in progress ...    DVT PPX  heparin     dispo d/w  at beside and he wants to take patient home . per SW note who spoke with daughter lso the family feels that can manage patient at home as of conversation 3/17/2022   arranging for hospital bed  and other equipmnet to  be placed

## 2022-03-18 NOTE — PROGRESS NOTE ADULT - SUBJECTIVE AND OBJECTIVE BOX
Patient is a 81y old  Female who presents with a chief complaint of Lethargy at home with decreased PO intake. (18 Mar 2022 10:56)      INTERVAL HX:  Patient seen and examined @ bedside.  No respiratory distress, chest pain or SOB.    PAST MEDICAL & SURGICAL HISTORY:  DM (diabetes mellitus)    No significant past surgical history      	  MEDICATIONS:  MEDICATIONS  (STANDING):  aspirin  chewable 81 milliGRAM(s) Oral daily  atorvastatin 20 milliGRAM(s) Oral at bedtime  collagenase Ointment 1 Application(s) Topical daily  dextrose 40% Gel 15 Gram(s) Oral once  dextrose 5%. 1000 milliLiter(s) (100 mL/Hr) IV Continuous <Continuous>  dextrose 5%. 1000 milliLiter(s) (50 mL/Hr) IV Continuous <Continuous>  dextrose 50% Injectable 25 Gram(s) IV Push once  dextrose 50% Injectable 12.5 Gram(s) IV Push once  dextrose 50% Injectable 25 Gram(s) IV Push once  glucagon  Injectable 1 milliGRAM(s) IntraMuscular once  heparin   Injectable 5000 Unit(s) SubCutaneous every 12 hours  influenza  Vaccine (HIGH DOSE) 0.7 milliLiter(s) IntraMuscular once  insulin lispro (ADMELOG) corrective regimen sliding scale   SubCutaneous three times a day before meals  insulin lispro (ADMELOG) corrective regimen sliding scale   SubCutaneous at bedtime  metoprolol tartrate 25 milliGRAM(s) Oral two times a day  nystatin Powder 1 Application(s) Topical every 8 hours    MEDICATIONS  (PRN):      Vitals:  T(F): 98.3 (03-18-22 @ 11:00), Max: 98.7 (03-18-22 @ 00:12)  HR: 55 (03-18-22 @ 11:00) (55 - 126)  BP: 96/45 (03-18-22 @ 11:00) (91/76 - 135/100)  RR: 18 (03-18-22 @ 11:00) (18 - 18)  SpO2: 98% (03-18-22 @ 11:00) (97% - 98%)  Wt(kg): --    03-17 @ 07:01  -  03-18 @ 07:00  --------------------------------------------------------  IN:    Oral Fluid: 700 mL  Total IN: 700 mL    OUT:  Total OUT: 0 mL    Total NET: 700 mL              PHYSICAL EXAM:  General: Awake, responsive  CV: S1 S2 RRR  Lungs: CTABL  GI: Soft, BS +, ND, NT  Extremities: No edema    TELEMETRY: sinus tachycardia 90-120s w/ APC and PVC  	    LABS:	 	    CARDIAC MARKERS:          16 Mar 2022 07:31    141    |  113    |  6      ----------------------------<  103    4.4     |  25     |  0.44                               12.5   5.64  )-----------( 195      ( 17 Mar 2022 07:12 )             38.8 ,                       13.2   6.19  )-----------( CLUMPED    ( 16 Mar 2022 07:31 )             41.6   proBNP:   Lipid Profile: 03-16 @ 11:14  HDL/Total Cholesterol: --  HDL Chol:              41 mg/dL  Serum Chol:            131 mg/dL  Direct LDL:            --  Triglycerides:         101 mg/dL    HgA1c:   TSH: Thyroid Stimulating Hormone, Serum: 1.180 uU/mL (03-15 @ 12:10)

## 2022-03-18 NOTE — PROGRESS NOTE ADULT - ASSESSMENT
80 yo female with hx of DM, HLD, dementia  admitted for altered mental status likely in the setting of acute UTI. + UA, Culture - Urine (03.12.22 @ 18:42)>100,000 CFU/ml Proteus mirabilis Appears fairly dehydrated clinically on admit, + hypernatremia   New "atrial fibrillation" RVR, appears to be sinus with APC's, also noted to have short runs of NSVTs 3/15  HR now much improved on bbl, remains in sinus with frequent PACs and occasional PVCs   TTE 3/15/22: with Ef 35-40%, The mid and apical inferior wall is hypokinetic, Grade I DD, mild-mod MR, mod TR, mild CA    -Cont on metoprolol 25mg BID, unable to add ACE/ARB for CM in view of hypotension  -c/w Lipitor and ASA  -TSH 1.180  -s/p antibiotics Tx for UTI  -Hypernatremia now resolved with IV hydration  -Not a candidate for aggressive interventions due to baseline dementia/debility  -patient accepted for home hospice, will sign off, re-consult as needed

## 2022-03-18 NOTE — PROGRESS NOTE ADULT - ATTENDING COMMENTS
[facility] [place] fax refill request
Agree with above note and plan.  For home hospice.  Signing off.
LATE ENTRY= PT seen and examined with NP Junior, agree with above assessment and plan - family in discussion on GOC and pending hospice eval for some support in the home.
Pt seen and examined with NP Junior, agree with above assessment and plan. Pt with advanced dementia being treated for UTI, could consider hospice at home, family is  and 8 children (3 of whom are local) - family would like to consider hospice, GOC discussed with  and daughter, MOLST given to  to review. Family will discuss amongst themselves and follow up with us regarding goals.   Will follow.

## 2022-03-19 LAB
ALBUMIN SERPL ELPH-MCNC: 2.5 G/DL — LOW (ref 3.3–5)
ALP SERPL-CCNC: 71 U/L — SIGNIFICANT CHANGE UP (ref 40–120)
ALT FLD-CCNC: 42 U/L — SIGNIFICANT CHANGE UP (ref 12–78)
ANION GAP SERPL CALC-SCNC: 5 MMOL/L — SIGNIFICANT CHANGE UP (ref 5–17)
AST SERPL-CCNC: 22 U/L — SIGNIFICANT CHANGE UP (ref 15–37)
BILIRUB SERPL-MCNC: 0.4 MG/DL — SIGNIFICANT CHANGE UP (ref 0.2–1.2)
BUN SERPL-MCNC: 8 MG/DL — SIGNIFICANT CHANGE UP (ref 7–23)
CALCIUM SERPL-MCNC: 8.5 MG/DL — SIGNIFICANT CHANGE UP (ref 8.5–10.1)
CHLORIDE SERPL-SCNC: 108 MMOL/L — SIGNIFICANT CHANGE UP (ref 96–108)
CO2 SERPL-SCNC: 30 MMOL/L — SIGNIFICANT CHANGE UP (ref 22–31)
CREAT SERPL-MCNC: 0.5 MG/DL — SIGNIFICANT CHANGE UP (ref 0.5–1.3)
EGFR: 94 ML/MIN/1.73M2 — SIGNIFICANT CHANGE UP
FLUAV AG NPH QL: SIGNIFICANT CHANGE UP
FLUBV AG NPH QL: SIGNIFICANT CHANGE UP
GLUCOSE BLDC GLUCOMTR-MCNC: 165 MG/DL — HIGH (ref 70–99)
GLUCOSE BLDC GLUCOMTR-MCNC: 177 MG/DL — HIGH (ref 70–99)
GLUCOSE BLDC GLUCOMTR-MCNC: 245 MG/DL — HIGH (ref 70–99)
GLUCOSE BLDC GLUCOMTR-MCNC: 280 MG/DL — HIGH (ref 70–99)
GLUCOSE SERPL-MCNC: 263 MG/DL — HIGH (ref 70–99)
HCT VFR BLD CALC: 37.4 % — SIGNIFICANT CHANGE UP (ref 34.5–45)
HGB BLD-MCNC: 12.3 G/DL — SIGNIFICANT CHANGE UP (ref 11.5–15.5)
MAGNESIUM SERPL-MCNC: 2.2 MG/DL — SIGNIFICANT CHANGE UP (ref 1.6–2.6)
MCHC RBC-ENTMCNC: 29.6 PG — SIGNIFICANT CHANGE UP (ref 27–34)
MCHC RBC-ENTMCNC: 32.9 G/DL — SIGNIFICANT CHANGE UP (ref 32–36)
MCV RBC AUTO: 89.9 FL — SIGNIFICANT CHANGE UP (ref 80–100)
NRBC # BLD: 0 /100 WBCS — SIGNIFICANT CHANGE UP (ref 0–0)
PHOSPHATE SERPL-MCNC: 2 MG/DL — LOW (ref 2.5–4.5)
PLATELET # BLD AUTO: SIGNIFICANT CHANGE UP K/UL (ref 150–400)
POTASSIUM SERPL-MCNC: 3.7 MMOL/L — SIGNIFICANT CHANGE UP (ref 3.5–5.3)
POTASSIUM SERPL-SCNC: 3.7 MMOL/L — SIGNIFICANT CHANGE UP (ref 3.5–5.3)
PROT SERPL-MCNC: 6.1 GM/DL — SIGNIFICANT CHANGE UP (ref 6–8.3)
RBC # BLD: 4.16 M/UL — SIGNIFICANT CHANGE UP (ref 3.8–5.2)
RBC # FLD: 15.1 % — HIGH (ref 10.3–14.5)
SARS-COV-2 RNA SPEC QL NAA+PROBE: SIGNIFICANT CHANGE UP
SODIUM SERPL-SCNC: 143 MMOL/L — SIGNIFICANT CHANGE UP (ref 135–145)
WBC # BLD: 5.44 K/UL — SIGNIFICANT CHANGE UP (ref 3.8–10.5)
WBC # FLD AUTO: 5.44 K/UL — SIGNIFICANT CHANGE UP (ref 3.8–10.5)

## 2022-03-19 PROCEDURE — 99232 SBSQ HOSP IP/OBS MODERATE 35: CPT

## 2022-03-19 RX ORDER — POTASSIUM PHOSPHATE, MONOBASIC POTASSIUM PHOSPHATE, DIBASIC 236; 224 MG/ML; MG/ML
15 INJECTION, SOLUTION INTRAVENOUS ONCE
Refills: 0 | Status: COMPLETED | OUTPATIENT
Start: 2022-03-19 | End: 2022-03-19

## 2022-03-19 RX ADMIN — NYSTATIN CREAM 1 APPLICATION(S): 100000 CREAM TOPICAL at 06:15

## 2022-03-19 RX ADMIN — NYSTATIN CREAM 1 APPLICATION(S): 100000 CREAM TOPICAL at 21:21

## 2022-03-19 RX ADMIN — Medication 6: at 11:28

## 2022-03-19 RX ADMIN — HEPARIN SODIUM 5000 UNIT(S): 5000 INJECTION INTRAVENOUS; SUBCUTANEOUS at 06:16

## 2022-03-19 RX ADMIN — POTASSIUM PHOSPHATE, MONOBASIC POTASSIUM PHOSPHATE, DIBASIC 62.5 MILLIMOLE(S): 236; 224 INJECTION, SOLUTION INTRAVENOUS at 16:58

## 2022-03-19 RX ADMIN — Medication 1 APPLICATION(S): at 11:28

## 2022-03-19 RX ADMIN — Medication 2: at 16:25

## 2022-03-19 RX ADMIN — Medication 25 MILLIGRAM(S): at 06:17

## 2022-03-19 RX ADMIN — ATORVASTATIN CALCIUM 20 MILLIGRAM(S): 80 TABLET, FILM COATED ORAL at 21:21

## 2022-03-19 RX ADMIN — HEPARIN SODIUM 5000 UNIT(S): 5000 INJECTION INTRAVENOUS; SUBCUTANEOUS at 17:25

## 2022-03-19 RX ADMIN — NYSTATIN CREAM 1 APPLICATION(S): 100000 CREAM TOPICAL at 14:33

## 2022-03-19 RX ADMIN — Medication 2: at 07:49

## 2022-03-19 RX ADMIN — Medication 81 MILLIGRAM(S): at 11:28

## 2022-03-19 RX ADMIN — Medication 25 MILLIGRAM(S): at 17:25

## 2022-03-19 NOTE — PROGRESS NOTE ADULT - ASSESSMENT
81 year old female PMH DM, HLD, advanced dementia presented with altered mental status admitted for metabolic encephalopathy 2/2 UTI found to have to onset Afib.    New onset rapid afib on telemetry, Demand ischemia 2/2 dehydration-   afib  rate controlled    appreciate cardiology note   3/17/2022 d/w telemetry tech no afib patient  in fact has sinus arrythmia   3/18/2022 yesterday required IV push lopressor for elevated heart rate. appreciate cardiology nte beta blocker was increased will monitor.   3/19/2022 hr stable . continue with lopressor       Metabolic encephalopathy 2/2 UTI and advanced dementia  Speech swallow study: passed    urine cultures: Proteus sensitive to ceftriaxone   s/p treatement    Dehydration  s/p LR bolus    Advanced dementia  Palliative care consult    Hyperglycemia 2/2 uncontrolled diabetes resolved       HyperNa+ resolved      multiple decubit will get wound consult     DVT PPX  heparin     dispo Hospice- note from 3/17/2022 reviewed placement will be difficult as its just the  and wife and he is unable to take care of her and home Hospice will likely be unsafe as he is the only caregiver.  eval and placement in progress ...    3/18/2022  d/w  at [facility] and he wants to take patient home . per LUZ MARIA note who spoke with daughter also the family feels that can manage patient at home as of conversation 3/17/2022   arranging for hospital bed  and other equipment to  be placed   3/19/2022 f/u with sw on equipment placement , luz maria is aware   81 year old female PMH DM, HLD, advanced dementia presented with altered mental status admitted for metabolic encephalopathy 2/2 UTI found to have to onset Afib.    New onset rapid afib on telemetry, Demand ischemia 2/2 dehydration-   afib  rate controlled    appreciate cardiology note   3/17/2022 d/w telemetry tech no afib patient  in fact has sinus arrythmia   3/18/2022 yesterday required IV push lopressor for elevated heart rate. appreciate cardiology nte beta blocker was increased will monitor.   3/19/2022 hr stable . continue with lopressor       Metabolic encephalopathy 2/2 UTI and advanced dementia  Speech swallow study: passed    urine cultures: Proteus sensitive to ceftriaxone   s/p treatement    Dehydration  s/p LR bolus    Advanced dementia  Palliative care consult    Hyperglycemia 2/2 uncontrolled diabetes resolved       HyperNa+ resolved      multiple decubit will get wound consult    functional quadriplegia : bedbound for >3 months , not a rehab candidate    DVT PPX  heparin     dispo Hospice- note from 3/17/2022 reviewed placement will be difficult as its just the  and wife and he is unable to take care of her and home Hospice will likely be unsafe as he is the only caregiver.  eval and placement in progress ...    3/18/2022  d/w  at beside and he wants to take patient home . per SW note who spoke with daughter also the family feels that can manage patient at home as of conversation 3/17/2022   arranging for hospital bed  and other equipment to  be placed   3/19/2022 f/u with sw on equipment placement , sw is aware

## 2022-03-19 NOTE — PROGRESS NOTE ADULT - SUBJECTIVE AND OBJECTIVE BOX
Patient is a 81y old  Female who presents with a chief complaint of Lethargy at home with decreased PO intake. (14 Mar 2022 15:30)    INTERVAL HPI/ Patients seen and examined at bedside this morning. No acute events overnight. Pt reports wanting water. Seen at bedside with  at 11:45 am (Stef). telephone number verified [phone]. As per , they have been  for 50+ years. She has advanced stage dementia for several years now currently bedridden. Full assist. Only eats yoghurt and ice cream at times to maintain nutrition. Lives with  and mentally challenged son (40s).  takes care of wife and son with no help. They have 7 other children who don't assist or stop by to visit. Patient wants to be home. Denies CP.     Overnight: none    MEDICATIONS  (STANDING):  aspirin  chewable 81 milliGRAM(s) Oral daily  atorvastatin 20 milliGRAM(s) Oral at bedtime  collagenase Ointment 1 Application(s) Topical daily  dextrose 40% Gel 15 Gram(s) Oral once  dextrose 5%. 1000 milliLiter(s) (50 mL/Hr) IV Continuous <Continuous>  dextrose 5%. 1000 milliLiter(s) (100 mL/Hr) IV Continuous <Continuous>  dextrose 50% Injectable 25 Gram(s) IV Push once  dextrose 50% Injectable 12.5 Gram(s) IV Push once  dextrose 50% Injectable 25 Gram(s) IV Push once  glucagon  Injectable 1 milliGRAM(s) IntraMuscular once  heparin   Injectable 5000 Unit(s) SubCutaneous every 12 hours  influenza  Vaccine (HIGH DOSE) 0.7 milliLiter(s) IntraMuscular once  insulin lispro (ADMELOG) corrective regimen sliding scale   SubCutaneous three times a day before meals  insulin lispro (ADMELOG) corrective regimen sliding scale   SubCutaneous at bedtime  metoprolol tartrate 25 milliGRAM(s) Oral two times a day  nystatin Powder 1 Application(s) Topical every 8 hours    MEDICATIONS  (PRN):    Vital Signs Last 24 Hrs  T(C): 36.6 (19 Mar 2022 10:44), Max: 36.8 (18 Mar 2022 11:00)  T(F): 97.8 (19 Mar 2022 10:44), Max: 98.3 (18 Mar 2022 11:00)  HR: 70 (19 Mar 2022 10:44) (55 - 120)  BP: 100/53 (19 Mar 2022 10:44) (96/45 - 132/89)  BP(mean): 68 (19 Mar 2022 09:40) (68 - 69)  RR: 18 (19 Mar 2022 10:44) (17 - 19)  SpO2: 97% (19 Mar 2022 10:44) (95% - 98%)      PHYSICAL EXAM:  GENERAL: NAD, chronically ill appearing  HEAD:  Atraumatic, Normocephalic  EYES: EOMI, PERRLA, conjunctiva and sclera clear  ENMT: No tonsillar erythema, exudates, or enlargement; dry mucous membranes No lesions  NECK: Supple  NERVOUS SYSTEM:  Alert & Oriented X0,  Motor Strength 3/5 B/L upper and lower extremities;  CHEST/LUNG: Clear to percussion bilaterally; No rales, rhonchi, wheezing, or rubs  HEART: S1 s2  No murmurs, rubs, or gallops  ABDOMEN: Soft, Nontender, Nondistended; Bowel sounds present  EXTREMITIES:  2+ Peripheral Pulses, No clubbing, cyanosis, or edema    SKIN: multiple  decubiti    Labs                    CAPILLARY BLOOD GLUCOSE      POCT Blood Glucose.: 177 mg/dL (19 Mar 2022 07:41)  POCT Blood Glucose.: 148 mg/dL (18 Mar 2022 21:22)  POCT Blood Glucose.: 249 mg/dL (18 Mar 2022 18:14)  POCT Blood Glucose.: 265 mg/dL (18 Mar 2022 15:53)  POCT Blood Glucose.: 159 mg/dL (18 Mar 2022 11:16)          Culture - Urine (collected 12 Mar 2022 18:42)  Source: Clean Catch Clean Catch (Midstream)  Final Report (15 Mar 2022 11:46):    >100,000 CFU/ml Proteus mirabilis  Organism: Proteus mirabilis (15 Mar 2022 11:46)  Organism: Proteus mirabilis (15 Mar 2022 11:46)

## 2022-03-20 LAB
ANION GAP SERPL CALC-SCNC: 5 MMOL/L — SIGNIFICANT CHANGE UP (ref 5–17)
BUN SERPL-MCNC: 5 MG/DL — LOW (ref 7–23)
CALCIUM SERPL-MCNC: 8.7 MG/DL — SIGNIFICANT CHANGE UP (ref 8.5–10.1)
CHLORIDE SERPL-SCNC: 106 MMOL/L — SIGNIFICANT CHANGE UP (ref 96–108)
CO2 SERPL-SCNC: 29 MMOL/L — SIGNIFICANT CHANGE UP (ref 22–31)
CREAT SERPL-MCNC: 0.35 MG/DL — LOW (ref 0.5–1.3)
EGFR: 103 ML/MIN/1.73M2 — SIGNIFICANT CHANGE UP
GLUCOSE BLDC GLUCOMTR-MCNC: 120 MG/DL — HIGH (ref 70–99)
GLUCOSE BLDC GLUCOMTR-MCNC: 183 MG/DL — HIGH (ref 70–99)
GLUCOSE BLDC GLUCOMTR-MCNC: 245 MG/DL — HIGH (ref 70–99)
GLUCOSE BLDC GLUCOMTR-MCNC: 283 MG/DL — HIGH (ref 70–99)
GLUCOSE SERPL-MCNC: 169 MG/DL — HIGH (ref 70–99)
MAGNESIUM SERPL-MCNC: 2 MG/DL — SIGNIFICANT CHANGE UP (ref 1.6–2.6)
PHOSPHATE SERPL-MCNC: 2.4 MG/DL — LOW (ref 2.5–4.5)
POTASSIUM SERPL-MCNC: 3.9 MMOL/L — SIGNIFICANT CHANGE UP (ref 3.5–5.3)
POTASSIUM SERPL-SCNC: 3.9 MMOL/L — SIGNIFICANT CHANGE UP (ref 3.5–5.3)
SODIUM SERPL-SCNC: 140 MMOL/L — SIGNIFICANT CHANGE UP (ref 135–145)

## 2022-03-20 PROCEDURE — 99231 SBSQ HOSP IP/OBS SF/LOW 25: CPT

## 2022-03-20 RX ORDER — SODIUM,POTASSIUM PHOSPHATES 278-250MG
1 POWDER IN PACKET (EA) ORAL THREE TIMES A DAY
Refills: 0 | Status: COMPLETED | OUTPATIENT
Start: 2022-03-20 | End: 2022-03-20

## 2022-03-20 RX ADMIN — NYSTATIN CREAM 1 APPLICATION(S): 100000 CREAM TOPICAL at 05:29

## 2022-03-20 RX ADMIN — Medication 1 APPLICATION(S): at 16:00

## 2022-03-20 RX ADMIN — Medication 25 MILLIGRAM(S): at 05:28

## 2022-03-20 RX ADMIN — NYSTATIN CREAM 1 APPLICATION(S): 100000 CREAM TOPICAL at 17:55

## 2022-03-20 RX ADMIN — Medication 1 PACKET(S): at 16:00

## 2022-03-20 RX ADMIN — NYSTATIN CREAM 1 APPLICATION(S): 100000 CREAM TOPICAL at 23:35

## 2022-03-20 RX ADMIN — Medication 4: at 15:57

## 2022-03-20 RX ADMIN — ATORVASTATIN CALCIUM 20 MILLIGRAM(S): 80 TABLET, FILM COATED ORAL at 23:35

## 2022-03-20 RX ADMIN — Medication 25 MILLIGRAM(S): at 17:49

## 2022-03-20 RX ADMIN — Medication 1 PACKET(S): at 23:35

## 2022-03-20 RX ADMIN — HEPARIN SODIUM 5000 UNIT(S): 5000 INJECTION INTRAVENOUS; SUBCUTANEOUS at 05:28

## 2022-03-20 RX ADMIN — Medication 2: at 08:04

## 2022-03-20 RX ADMIN — Medication 81 MILLIGRAM(S): at 11:28

## 2022-03-20 RX ADMIN — Medication 6: at 11:28

## 2022-03-20 RX ADMIN — HEPARIN SODIUM 5000 UNIT(S): 5000 INJECTION INTRAVENOUS; SUBCUTANEOUS at 17:49

## 2022-03-20 NOTE — PROGRESS NOTE ADULT - SUBJECTIVE AND OBJECTIVE BOX
Patient is a 81y old  Female who presents with a chief complaint of Lethargy at home with decreased PO intake. (14 Mar 2022 15:30)    INTERVAL HPI/ Patients seen and examined at bedside this morning. No acute events overnight. Pt reports wanting water. Seen at bedside with  at 11:45 am (Stef). telephone number verified [phone]. As per , they have been  for 50+ years. She has advanced stage dementia for several years now currently bedridden. Full assist. Only eats yoghurt and ice cream at times to maintain nutrition. Lives with  and mentally challenged son (40s).  takes care of wife and son with no help. They have 7 other children who don't assist or stop by to visit. Patient wants to be home. Denies CP.     Overnight: none  MEDICATIONS  (STANDING):  aspirin  chewable 81 milliGRAM(s) Oral daily  atorvastatin 20 milliGRAM(s) Oral at bedtime  collagenase Ointment 1 Application(s) Topical daily  dextrose 40% Gel 15 Gram(s) Oral once  dextrose 5%. 1000 milliLiter(s) (100 mL/Hr) IV Continuous <Continuous>  dextrose 5%. 1000 milliLiter(s) (50 mL/Hr) IV Continuous <Continuous>  dextrose 50% Injectable 25 Gram(s) IV Push once  dextrose 50% Injectable 12.5 Gram(s) IV Push once  dextrose 50% Injectable 25 Gram(s) IV Push once  glucagon  Injectable 1 milliGRAM(s) IntraMuscular once  heparin   Injectable 5000 Unit(s) SubCutaneous every 12 hours  influenza  Vaccine (HIGH DOSE) 0.7 milliLiter(s) IntraMuscular once  insulin lispro (ADMELOG) corrective regimen sliding scale   SubCutaneous three times a day before meals  insulin lispro (ADMELOG) corrective regimen sliding scale   SubCutaneous at bedtime  metoprolol tartrate 25 milliGRAM(s) Oral two times a day  nystatin Powder 1 Application(s) Topical every 8 hours  potassium phosphate / sodium phosphate Powder (PHOS-NaK) 1 Packet(s) Oral three times a day    MEDICATIONS  (PRN):  Vital Signs Last 24 Hrs  T(C): 36.6 (20 Mar 2022 10:47), Max: 36.6 (19 Mar 2022 15:49)  T(F): 97.8 (20 Mar 2022 10:47), Max: 97.9 (19 Mar 2022 15:49)  HR: 79 (20 Mar 2022 10:47) (72 - 123)  BP: 101/68 (20 Mar 2022 10:47) (100/56 - 122/61)  BP(mean): 82 (20 Mar 2022 04:50) (71 - 82)  RR: 17 (20 Mar 2022 10:47) (17 - 20)  SpO2: 94% (20 Mar 2022 10:47) (94% - 100%)    PHYSICAL EXAM:  GENERAL: NAD, chronically ill appearing  HEAD:  Atraumatic, Normocephalic  EYES: EOMI, PERRLA, conjunctiva and sclera clear  ENMT: No tonsillar erythema, exudates, or enlargement;  No lesions  NECK: Supple  NERVOUS SYSTEM:  Alert & Oriented X0,  Motor Strength 3/5 B/L upper and lower extremities;  CHEST/LUNG: Clear to percussion bilaterally; No rales, rhonchi, wheezing, or rubs  HEART: S1 s2  No murmurs, rubs, or gallops  ABDOMEN: Soft, Nontender, Nondistended; Bowel sounds present  EXTREMITIES:  2+ Peripheral Pulses, No clubbing, cyanosis, or edema    SKIN: multiple  decubiti    Labs                  CAPILLARY BLOOD GLUCOSE      POCT Blood Glucose.: 283 mg/dL (20 Mar 2022 11:12)  POCT Blood Glucose.: 183 mg/dL (20 Mar 2022 07:37)  POCT Blood Glucose.: 245 mg/dL (19 Mar 2022 21:13)  POCT Blood Glucose.: 165 mg/dL (19 Mar 2022 16:08)        Culture - Urine (collected 12 Mar 2022 18:42)  Source: Clean Catch Clean Catch (Midstream)  Final Report (15 Mar 2022 11:46):    >100,000 CFU/ml Proteus mirabilis  Organism: Proteus mirabilis (15 Mar 2022 11:46)  Organism: Proteus mirabilis (15 Mar 2022 11:46)            < from: TTE Echo Complete w/o Contrast w/ Doppler (03.15.22 @ 16:43) >      Summary:   1. Left ventricular ejection fraction, by visual estimation, is 35 to   40%.   2. Technically fair study.   3. Moderately decreased global left ventricular systolic function.   4. Mid and apical inferior wall is abnormal as described above.   5. Global cardiomyopathy.   6. Normal left ventricular internal cavity size.   7. Spectral Doppler shows impaired relaxation pattern of left   ventricular myocardial filling (Grade I diastolic dysfunction).   8. Normal right ventricular size and function.   9. Normal left atrial size.  10. Normal right atrial size.    < end of copied text >       Patient is a 81y old  Female who presents with a chief complaint of Lethargy at home with decreased PO intake. (14 Mar 2022 15:30)    INTERVAL HPI/ Patients seen and examined at bedside this morning. No acute events overnight. Pt reports wanting water. Seen at bedside with  at 11:45 am (Stef). telephone number verified [phone]. As per , they have been  for 50+ years. She has advanced stage dementia for several years now currently bedridden. Full assist. Only eats yoghurt and ice cream at times to maintain nutrition. Lives with  and mentally challenged son (40s).  takes care of wife and son with no help. They have 7 other children who don't assist or stop by to visit. Patient wants to be home. Denies CP.     Overnight:/ Today on tele still with intermittent NSVT and intermittent episodes of elevated hr  MEDICATIONS  (STANDING):  aspirin  chewable 81 milliGRAM(s) Oral daily  atorvastatin 20 milliGRAM(s) Oral at bedtime  collagenase Ointment 1 Application(s) Topical daily  dextrose 40% Gel 15 Gram(s) Oral once  dextrose 5%. 1000 milliLiter(s) (100 mL/Hr) IV Continuous <Continuous>  dextrose 5%. 1000 milliLiter(s) (50 mL/Hr) IV Continuous <Continuous>  dextrose 50% Injectable 25 Gram(s) IV Push once  dextrose 50% Injectable 12.5 Gram(s) IV Push once  dextrose 50% Injectable 25 Gram(s) IV Push once  glucagon  Injectable 1 milliGRAM(s) IntraMuscular once  heparin   Injectable 5000 Unit(s) SubCutaneous every 12 hours  influenza  Vaccine (HIGH DOSE) 0.7 milliLiter(s) IntraMuscular once  insulin lispro (ADMELOG) corrective regimen sliding scale   SubCutaneous three times a day before meals  insulin lispro (ADMELOG) corrective regimen sliding scale   SubCutaneous at bedtime  metoprolol tartrate 25 milliGRAM(s) Oral two times a day  nystatin Powder 1 Application(s) Topical every 8 hours  potassium phosphate / sodium phosphate Powder (PHOS-NaK) 1 Packet(s) Oral three times a day    MEDICATIONS  (PRN):  Vital Signs Last 24 Hrs  T(C): 36.6 (20 Mar 2022 10:47), Max: 36.6 (19 Mar 2022 15:49)  T(F): 97.8 (20 Mar 2022 10:47), Max: 97.9 (19 Mar 2022 15:49)  HR: 79 (20 Mar 2022 10:47) (72 - 123)  BP: 101/68 (20 Mar 2022 10:47) (100/56 - 122/61)  BP(mean): 82 (20 Mar 2022 04:50) (71 - 82)  RR: 17 (20 Mar 2022 10:47) (17 - 20)  SpO2: 94% (20 Mar 2022 10:47) (94% - 100%)    PHYSICAL EXAM:  GENERAL: NAD, chronically ill appearing  HEAD:  Atraumatic, Normocephalic  EYES: EOMI, PERRLA, conjunctiva and sclera clear  ENMT: No tonsillar erythema, exudates, or enlargement;  No lesions  NECK: Supple  NERVOUS SYSTEM:  Alert & Oriented X0,  Motor Strength 3/5 B/L upper and lower extremities;  CHEST/LUNG: Clear to percussion bilaterally; No rales, rhonchi, wheezing, or rubs  HEART: S1 s2  No murmurs, rubs, or gallops  ABDOMEN: Soft, Nontender, Nondistended; Bowel sounds present  EXTREMITIES:  2+ Peripheral Pulses, No clubbing, cyanosis, or edema    SKIN: multiple  decubiti    Labs                  CAPILLARY BLOOD GLUCOSE      POCT Blood Glucose.: 283 mg/dL (20 Mar 2022 11:12)  POCT Blood Glucose.: 183 mg/dL (20 Mar 2022 07:37)  POCT Blood Glucose.: 245 mg/dL (19 Mar 2022 21:13)  POCT Blood Glucose.: 165 mg/dL (19 Mar 2022 16:08)        Culture - Urine (collected 12 Mar 2022 18:42)  Source: Clean Catch Clean Catch (Midstream)  Final Report (15 Mar 2022 11:46):    >100,000 CFU/ml Proteus mirabilis  Organism: Proteus mirabilis (15 Mar 2022 11:46)  Organism: Proteus mirabilis (15 Mar 2022 11:46)            < from: TTE Echo Complete w/o Contrast w/ Doppler (03.15.22 @ 16:43) >      Summary:   1. Left ventricular ejection fraction, by visual estimation, is 35 to   40%.   2. Technically fair study.   3. Moderately decreased global left ventricular systolic function.   4. Mid and apical inferior wall is abnormal as described above.   5. Global cardiomyopathy.   6. Normal left ventricular internal cavity size.   7. Spectral Doppler shows impaired relaxation pattern of left   ventricular myocardial filling (Grade I diastolic dysfunction).   8. Normal right ventricular size and function.   9. Normal left atrial size.  10. Normal right atrial size.    < end of copied text >

## 2022-03-20 NOTE — PROGRESS NOTE ADULT - ASSESSMENT
81 year old female PMH DM, HLD, advanced dementia presented with altered mental status admitted for metabolic encephalopathy 2/2 UTI found to have to onset Afib.    New onset rapid afib on telemetry, Demand ischemia 2/2 dehydration-   afib  rate controlled    appreciate cardiology note   3/17/2022 d/w telemetry tech no afib patient  in fact has sinus arrythmia   3/18/2022 yesterday required IV push lopressor for elevated heart rate. appreciate cardiology note beta blocker was increased will monitor.   3/19/2022 hr stable . 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 appreciate cardiology consult, medical management       Metabolic encephalopathy 2/2 UTI and advanced dementia  Speech swallow study: passed    urine cultures: Proteus sensitive to ceftriaxone   s/p treatement    Dehydration  s/p LR bolus    Advanced dementia  Palliative care consult    Hyperglycemia 2/2 uncontrolled diabetes resolved    can resume oral meds upon discharge       HyperNa+ resolved      multiple decubit will get wound consult   3/20/2022 recommendations noted and were ordered at time of their evaluation     functional quadriplegia : bedbound for >3 months , not a rehab candidate    DVT PPX  heparin     dispo Hospice- note from 3/17/2022 reviewed placement will be difficult as its just the  and wife and he is unable to take care of her and home Hospice will likely be unsafe as he is the only caregiver.  eval and placement in progress ...    3/18/2022  d/w  at [facility] and he wants to take patient home . per LUZ MARIA note who spoke with daughter also the family feels that can manage patient at home as of conversation 3/17/2022   arranging for hospital bed  and other equipment to  be placed   3/19/2022 f/u with sw on equipment placement , luz maria is aware  3/20/2022 per family unable to take patient home until Tuesday see SW note for more details    81 year old female PMH DM, HLD, advanced dementia presented with altered mental status admitted for metabolic encephalopathy 2/2 UTI found to have to onset Afib.    New onset rapid afib on telemetry, Demand ischemia 2/2 dehydration-   afib  rate controlled    appreciate cardiology note   3/17/2022 d/w telemetry tech no afib patient  in fact has sinus arrythmia   3/18/2022 yesterday required IV push lopressor for elevated heart rate. appreciate cardiology note beta blocker was increased will monitor.   3/19/2022 hr stable . 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 appreciate cardiology consult, medical management       Metabolic encephalopathy 2/2 UTI and advanced dementia  Speech swallow study: passed    urine cultures: Proteus sensitive to ceftriaxone   s/p treatement    Dehydration  s/p LR bolus    Advanced dementia  Palliative care consult    Hyperglycemia 2/2 uncontrolled diabetes resolved    can resume oral meds upon discharge       HyperNa+ resolved      multiple decubit will get wound consult   3/20/2022 recommendations noted and were ordered at time of their evaluation     functional quadriplegia : bedbound for >3 months , not a rehab candidate    DVT PPX  heparin     dispo Hospice- note from 3/17/2022 reviewed placement will be difficult as its just the  and wife and he is unable to take care of her and home Hospice will likely be unsafe as he is the only caregiver.  eval and placement in progress ...    3/18/2022  d/w  at [facility] and he wants to take patient home . per LUZ MARIA note who spoke with daughter also the family feels that can manage patient at home as of conversation 3/17/2022   arranging for hospital bed  and other equipment to  be placed   3/19/2022 f/u with sw on equipment placement , luz maria is aware  3/20/2022 per family unable to take patient home until Tuesday see SW note for more details , family has not  agreed for dnr

## 2022-03-21 LAB
GLUCOSE BLDC GLUCOMTR-MCNC: 139 MG/DL — HIGH (ref 70–99)
GLUCOSE BLDC GLUCOMTR-MCNC: 174 MG/DL — HIGH (ref 70–99)
GLUCOSE BLDC GLUCOMTR-MCNC: 236 MG/DL — HIGH (ref 70–99)
GLUCOSE BLDC GLUCOMTR-MCNC: 248 MG/DL — HIGH (ref 70–99)
GLUCOSE BLDC GLUCOMTR-MCNC: 271 MG/DL — HIGH (ref 70–99)

## 2022-03-21 PROCEDURE — 99231 SBSQ HOSP IP/OBS SF/LOW 25: CPT

## 2022-03-21 RX ORDER — METOPROLOL TARTRATE 50 MG
25 TABLET ORAL DAILY
Refills: 0 | Status: DISCONTINUED | OUTPATIENT
Start: 2022-03-21 | End: 2022-03-21

## 2022-03-21 RX ORDER — METOPROLOL TARTRATE 50 MG
25 TABLET ORAL
Refills: 0 | Status: DISCONTINUED | OUTPATIENT
Start: 2022-03-21 | End: 2022-03-22

## 2022-03-21 RX ADMIN — ATORVASTATIN CALCIUM 20 MILLIGRAM(S): 80 TABLET, FILM COATED ORAL at 21:13

## 2022-03-21 RX ADMIN — Medication 25 MILLIGRAM(S): at 18:09

## 2022-03-21 RX ADMIN — HEPARIN SODIUM 5000 UNIT(S): 5000 INJECTION INTRAVENOUS; SUBCUTANEOUS at 17:04

## 2022-03-21 RX ADMIN — Medication 1 APPLICATION(S): at 11:25

## 2022-03-21 RX ADMIN — Medication 6: at 11:26

## 2022-03-21 RX ADMIN — NYSTATIN CREAM 1 APPLICATION(S): 100000 CREAM TOPICAL at 21:12

## 2022-03-21 RX ADMIN — Medication 81 MILLIGRAM(S): at 11:21

## 2022-03-21 RX ADMIN — NYSTATIN CREAM 1 APPLICATION(S): 100000 CREAM TOPICAL at 06:06

## 2022-03-21 RX ADMIN — Medication 2: at 16:58

## 2022-03-21 RX ADMIN — HEPARIN SODIUM 5000 UNIT(S): 5000 INJECTION INTRAVENOUS; SUBCUTANEOUS at 06:06

## 2022-03-21 RX ADMIN — NYSTATIN CREAM 1 APPLICATION(S): 100000 CREAM TOPICAL at 16:57

## 2022-03-21 NOTE — PROGRESS NOTE ADULT - PROVIDER SPECIALTY LIST ADULT
Cardiology
Hospitalist
Cardiology
Cardiology
Hospitalist
Cardiology
Hospitalist
Palliative Care
Palliative Care

## 2022-03-21 NOTE — PROGRESS NOTE ADULT - SUBJECTIVE AND OBJECTIVE BOX
Patient is a 81y old  Female who presents with a chief complaint of Lethargy at home with decreased PO intake. (14 Mar 2022 15:30)    INTERVAL HPI/ Patients seen and examined at bedside this morning. No acute events overnight. Pt reports wanting water. Seen at bedside with  at 11:45 am (Stef). telephone number verified [phone]. As per , they have been  for 50+ years. She has advanced stage dementia for several years now currently bedridden. Full assist. Only eats yoghurt and ice cream at times to maintain nutrition. Lives with  and mentally challenged son (40s).  takes care of wife and son with no help. They have 7 other children who don't assist or stop by to visit. Patient wants to be home. Denies CP.   Overnight : none  MEDICATIONS  (STANDING):  aspirin  chewable 81 milliGRAM(s) Oral daily  atorvastatin 20 milliGRAM(s) Oral at bedtime  collagenase Ointment 1 Application(s) Topical daily  dextrose 40% Gel 15 Gram(s) Oral once  dextrose 5%. 1000 milliLiter(s) (50 mL/Hr) IV Continuous <Continuous>  dextrose 5%. 1000 milliLiter(s) (100 mL/Hr) IV Continuous <Continuous>  dextrose 50% Injectable 25 Gram(s) IV Push once  dextrose 50% Injectable 12.5 Gram(s) IV Push once  dextrose 50% Injectable 25 Gram(s) IV Push once  glucagon  Injectable 1 milliGRAM(s) IntraMuscular once  heparin   Injectable 5000 Unit(s) SubCutaneous every 12 hours  influenza  Vaccine (HIGH DOSE) 0.7 milliLiter(s) IntraMuscular once  insulin lispro (ADMELOG) corrective regimen sliding scale   SubCutaneous three times a day before meals  insulin lispro (ADMELOG) corrective regimen sliding scale   SubCutaneous at bedtime  metoprolol tartrate 25 milliGRAM(s) Oral two times a day  nystatin Powder 1 Application(s) Topical every 8 hours    MEDICATIONS  (PRN):    Vital Signs Last 24 Hrs  T(C): 36.7 (21 Mar 2022 04:45), Max: 36.7 (21 Mar 2022 04:45)  T(F): 98.1 (21 Mar 2022 04:45), Max: 98.1 (21 Mar 2022 04:45)  HR: 55 (21 Mar 2022 06:14) (54 - 123)  BP: 101/54 (21 Mar 2022 06:14) (100/68 - 113/45)  BP(mean): --  RR: 18 (21 Mar 2022 04:45) (16 - 18)  SpO2: 99% (21 Mar 2022 04:45) (93% - 99%)  PHYSICAL EXAM:  GENERAL: NAD, chronically ill appearing  HEAD:  Atraumatic, Normocephalic  EYES: EOMI, PERRLA, conjunctiva and sclera clear  ENMT: No tonsillar erythema, exudates, or enlargement;  No lesions  NECK: Supple  NERVOUS SYSTEM:  Alert & Oriented X0,  Motor Strength 3/5 B/L upper and lower extremities;  CHEST/LUNG: Clear to percussion bilaterally; No rales, rhonchi, wheezing, or rubs  HEART: S1 s2  No murmurs, rubs, or gallops  ABDOMEN: Soft, Nontender, Nondistended; Bowel sounds present  EXTREMITIES:  2+ Peripheral Pulses, No clubbing, cyanosis, or edema    SKIN: multiple  decubiti    Labs                        12.3   5.44  )-----------( CLUMPED    ( 19 Mar 2022 12:43 )             37.4   03-20    140  |  106  |  5<L>  ----------------------------<  169<H>  3.9   |  29  |  0.35<L>    Ca    8.7      20 Mar 2022 07:09  Phos  2.4     03-20  Mg     2.0     03-20    TPro  6.1  /  Alb  2.5<L>  /  TBili  0.4  /  DBili  x   /  AST  22  /  ALT  42  /  AlkPhos  71  03-19      Culture - Urine (collected 12 Mar 2022 18:42)  Source: Clean Catch Clean Catch (Midstream)  Final Report (15 Mar 2022 11:46):    >100,000 CFU/ml Proteus mirabilis  Organism: Proteus mirabilis (15 Mar 2022 11:46)  Organism: Proteus mirabilis (15 Mar 2022 11:46)            < from: TTE Echo Complete w/o Contrast w/ Doppler (03.15.22 @ 16:43) >      Summary:   1. Left ventricular ejection fraction, by visual estimation, is 35 to   40%.   2. Technically fair study.   3. Moderately decreased global left ventricular systolic function.   4. Mid and apical inferior wall is abnormal as described above.   5. Global cardiomyopathy.   6. Normal left ventricular internal cavity size.   7. Spectral Doppler shows impaired relaxation pattern of left   ventricular myocardial filling (Grade I diastolic dysfunction).   8. Normal right ventricular size and function.   9. Normal left atrial size.  10. Normal right atrial size.    < end of copied text >       Patient is a 81y old  Female who presents with a chief complaint of Lethargy at home with decreased PO intake. (14 Mar 2022 15:30)    INTERVAL HPI/ Patients seen and examined at bedside this morning. No acute events overnight. Pt reports wanting water. Seen at bedside with  at 11:45 am (Stef). telephone number verified [phone]. As per , they have been  for 50+ years. She has advanced stage dementia for several years now currently bedridden. Full assist. Only eats yoghurt and ice cream at times to maintain nutrition. Lives with  and mentally challenged son (40s).  takes care of wife and son with no help. They have 7 other children who don't assist or stop by to visit. Patient wants to be home. Denies CP.     Overnight : having intermittent episodes of tachycardia.   Today: will d/w cardiology of any rom for meds adjustment  also d/w telemetry tech the vitals at 4am and 6am show HR in the 50s but on telemetry monitoring there were no episodes of Bradycardia-    MEDICATIONS  (STANDING):  aspirin  chewable 81 milliGRAM(s) Oral daily  atorvastatin 20 milliGRAM(s) Oral at bedtime  collagenase Ointment 1 Application(s) Topical daily  dextrose 40% Gel 15 Gram(s) Oral once  dextrose 5%. 1000 milliLiter(s) (50 mL/Hr) IV Continuous <Continuous>  dextrose 5%. 1000 milliLiter(s) (100 mL/Hr) IV Continuous <Continuous>  dextrose 50% Injectable 25 Gram(s) IV Push once  dextrose 50% Injectable 12.5 Gram(s) IV Push once  dextrose 50% Injectable 25 Gram(s) IV Push once  glucagon  Injectable 1 milliGRAM(s) IntraMuscular once  heparin   Injectable 5000 Unit(s) SubCutaneous every 12 hours  influenza  Vaccine (HIGH DOSE) 0.7 milliLiter(s) IntraMuscular once  insulin lispro (ADMELOG) corrective regimen sliding scale   SubCutaneous three times a day before meals  insulin lispro (ADMELOG) corrective regimen sliding scale   SubCutaneous at bedtime  metoprolol tartrate 25 milliGRAM(s) Oral two times a day  nystatin Powder 1 Application(s) Topical every 8 hours    MEDICATIONS  (PRN):    Vital Signs Last 24 Hrs  T(C): 36.7 (21 Mar 2022 04:45), Max: 36.7 (21 Mar 2022 04:45)  T(F): 98.1 (21 Mar 2022 04:45), Max: 98.1 (21 Mar 2022 04:45)  HR: 55 (21 Mar 2022 06:14) (54 - 123)  BP: 101/54 (21 Mar 2022 06:14) (100/68 - 113/45)  BP(mean): --  RR: 18 (21 Mar 2022 04:45) (16 - 18)  SpO2: 99% (21 Mar 2022 04:45) (93% - 99%)    PHYSICAL EXAM:  GENERAL: NAD, chronically ill appearing  HEAD:  Atraumatic, Normocephalic  EYES: EOMI, PERRLA, conjunctiva and sclera clear  ENMT: No tonsillar erythema, exudates, or enlargement;  No lesions  NECK: Supple  NERVOUS SYSTEM:  Alert & Oriented X0,  Motor Strength 3/5 B/L upper and lower extremities;  CHEST/LUNG: Clear to percussion bilaterally; No rales, rhonchi, wheezing, or rubs  HEART: S1 s2  No murmurs, rubs, or gallops  ABDOMEN: Soft, Nontender, Nondistended; Bowel sounds present  EXTREMITIES:  2+ Peripheral Pulses, No clubbing, cyanosis, or edema    SKIN: multiple  decubiti    Labs                        12.3   5.44  )-----------( CLUMPED    ( 19 Mar 2022 12:43 )             37.4   03-20    140  |  106  |  5<L>  ----------------------------<  169<H>  3.9   |  29  |  0.35<L>    Ca    8.7      20 Mar 2022 07:09  Phos  2.4     03-20  Mg     2.0     03-20    TPro  6.1  /  Alb  2.5<L>  /  TBili  0.4  /  DBili  x   /  AST  22  /  ALT  42  /  AlkPhos  71  03-19      Culture - Urine (collected 12 Mar 2022 18:42)  Source: Clean Catch Clean Catch (Midstream)  Final Report (15 Mar 2022 11:46):    >100,000 CFU/ml Proteus mirabilis  Organism: Proteus mirabilis (15 Mar 2022 11:46)  Organism: Proteus mirabilis (15 Mar 2022 11:46)            < from: TTE Echo Complete w/o Contrast w/ Doppler (03.15.22 @ 16:43) >      Summary:   1. Left ventricular ejection fraction, by visual estimation, is 35 to   40%.   2. Technically fair study.   3. Moderately decreased global left ventricular systolic function.   4. Mid and apical inferior wall is abnormal as described above.   5. Global cardiomyopathy.   6. Normal left ventricular internal cavity size.   7. Spectral Doppler shows impaired relaxation pattern of left   ventricular myocardial filling (Grade I diastolic dysfunction).   8. Normal right ventricular size and function.   9. Normal left atrial size.  10. Normal right atrial size.    < end of copied text >

## 2022-03-21 NOTE — PROGRESS NOTE ADULT - REASON FOR ADMISSION
Lethargy at home with decreased PO intake.

## 2022-03-21 NOTE — PROGRESS NOTE ADULT - NUTRITIONAL ASSESSMENT
This patient has been assessed with a concern for Malnutrition and has been determined to have a diagnosis/diagnoses of Severe protein-calorie malnutrition and Underweight (BMI < 19).    This patient is being managed with:   Diet Pureed-  Consistent Carbohydrate {Evening Snack}  Mildly Thick Liquids (MILDTHICKLIQS)  Low Sodium  Supplement Feeding Modality:  Oral  Ensure Pudding Cans or Servings Per Day:  1       Frequency:  Three Times a day  Entered: Mar 14 2022 11:06AM    

## 2022-03-21 NOTE — PROGRESS NOTE ADULT - ASSESSMENT
81 year old female PMH DM, HLD, advanced dementia presented with altered mental status admitted for metabolic encephalopathy 2/2 UTI found to have to onset Afib.    New onset rapid afib on telemetry, Demand ischemia 2/2 dehydration-   afib  rate controlled    appreciate cardiology note   3/17/2022 d/w telemetry tech no afib patient  in fact has sinus arrythmia   3/18/2022 yesterday required IV push lopressor for elevated heart rate. appreciate cardiology note beta blocker was increased will monitor.   3/19/2022 hr stable . 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 appreciate cardiology consult, medical management       Metabolic encephalopathy 2/2 UTI and advanced dementia  Speech swallow study: passed    urine cultures: Proteus sensitive to ceftriaxone   s/p treatement    Dehydration  s/p LR bolus    Advanced dementia  Palliative care consult    Hyperglycemia 2/2 uncontrolled diabetes resolved    can resume oral meds upon discharge       HyperNa+ resolved      multiple decubit will get wound consult   3/20/2022 recommendations noted and were ordered at time of their evaluation     functional quadriplegia : bedbound for >3 months , not a rehab candidate    DVT PPX  heparin     dispo Hospice- note from 3/17/2022 reviewed placement will be difficult as its just the  and wife and he is unable to take care of her and home Hospice will likely be unsafe as he is the only caregiver.  eval and placement in progress ...    3/18/2022  d/w  at [facility] and he wants to take patient home . per LUZ MARIA note who spoke with daughter also the family feels that can manage patient at home as of conversation 3/17/2022   arranging for hospital bed  and other equipment to  be placed   3/19/2022 f/u with sw on equipment placement , luz maria is aware  3/20/2022 per family unable to take patient home until Tuesday see SW note for more details , family has not  agreed for dnr   81 year old female PMH DM, HLD, advanced dementia presented with altered mental status admitted for metabolic encephalopathy 2/2 UTI found to have to onset Afib.    New onset rapid afib on telemetry, Demand ischemia 2/2 dehydration-   afib  rate controlled    appreciate cardiology note   3/17/2022 d/w telemetry tech no afib patient  in fact has sinus arrythmia   3/18/2022 yesterday required IV push lopressor for elevated heart rate. appreciate cardiology note beta blocker was increased will monitor.   3/19/2022 hr stable . 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 appreciate cardiology consult, medical management       Metabolic encephalopathy 2/2 UTI and advanced dementia  Speech swallow study: passed    urine cultures: Proteus sensitive to ceftriaxone   s/p treatment    Dehydration  s/p LR bolus    Advanced dementia  Palliative care consult    Hyperglycemia 2/2 uncontrolled diabetes resolved    can resume oral meds upon discharge       HyperNa+ resolved      multiple decubit will get wound consult   3/20/2022 recommendations noted and were ordered at time of their evaluation     functional quadriplegia : bedbound for >3 months , not a rehab candidate    DVT PPX  heparin     dispo Hospice- note from 3/17/2022 reviewed placement will be difficult as its just the  and wife and he is unable to take care of her and home Hospice will likely be unsafe as he is the only caregiver.  eval and placement in progress ...    3/18/2022  d/w  at [facility] and he wants to take patient home . per SW note who spoke with daughter also the family feels that can manage patient at home as of conversation 3/17/2022   arranging for hospital bed  and other equipment to  be placed   3/19/2022 f/u with sw on equipment placement , luz maria is aware  3/20/2022 per family unable to take patient home until Tuesday see SW note for more details , family has not  agreed for dnr   81 year old female PMH DM, HLD, advanced dementia presented with altered mental status admitted for metabolic encephalopathy 2/2 UTI found to have to onset Afib.    New onset rapid afib on telemetry, Demand ischemia 2/2 dehydration-   afib  rate controlled    appreciate cardiology note   3/17/2022 d/w telemetry tech no afib patient  in fact has sinus arrythmia   3/18/2022 yesterday required IV push lopressor for elevated heart rate. appreciate cardiology note beta blocker was increased will monitor.   3/19/2022 hr stable . 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 appreciate cardiology consult, medical management       Metabolic encephalopathy 2/2 UTI and advanced dementia  Speech swallow study: passed    urine cultures: Proteus sensitive to ceftriaxone   s/p treatment    Dehydration  s/p LR bolus    Advanced dementia  Palliative care consult -noted    Hyperglycemia 2/2 uncontrolled diabetes resolved    can resume oral meds upon discharge       HyperNa+ resolved      multiple decubit will get wound consult   3/20/2022 recommendations noted and were ordered at time of their evaluation     functional quadriplegia : bedbound for >3 months , not a rehab candidate    DVT PPX  heparin     dispo Hospice- note from 3/17/2022 reviewed placement will be difficult as its just the  and wife and he is unable to take care of her and home Hospice will likely be unsafe as he is the only caregiver.  eval and placement in progress ...    3/18/2022  d/w  at [facility] and he wants to take patient home . per LUZ MARIA note who spoke with daughter also the family feels that can manage patient at home as of conversation 3/17/2022   arranging for hospital bed  and other equipment to  be placed   3/19/2022 f/u with sw on equipment placement , luz maria is aware  3/20/2022 per family unable to take patient home until Tuesday see SW note for more details , family has not  agreed for dnr

## 2022-03-22 VITALS
DIASTOLIC BLOOD PRESSURE: 53 MMHG | SYSTOLIC BLOOD PRESSURE: 99 MMHG | TEMPERATURE: 98 F | RESPIRATION RATE: 18 BRPM | OXYGEN SATURATION: 98 % | HEART RATE: 77 BPM

## 2022-03-22 LAB
FLUAV AG NPH QL: SIGNIFICANT CHANGE UP
FLUBV AG NPH QL: SIGNIFICANT CHANGE UP
GLUCOSE BLDC GLUCOMTR-MCNC: 178 MG/DL — HIGH (ref 70–99)
SARS-COV-2 RNA SPEC QL NAA+PROBE: SIGNIFICANT CHANGE UP

## 2022-03-22 PROCEDURE — 99239 HOSP IP/OBS DSCHRG MGMT >30: CPT

## 2022-03-22 RX ORDER — METOPROLOL TARTRATE 50 MG
1 TABLET ORAL
Qty: 30 | Refills: 0
Start: 2022-03-22 | End: 2022-04-20

## 2022-03-22 RX ORDER — COLLAGENASE CLOSTRIDIUM HIST. 250 UNIT/G
1 OINTMENT (GRAM) TOPICAL
Qty: 1 | Refills: 0
Start: 2022-03-22 | End: 2022-04-20

## 2022-03-22 RX ORDER — ASPIRIN/CALCIUM CARB/MAGNESIUM 324 MG
1 TABLET ORAL
Qty: 30 | Refills: 0
Start: 2022-03-22 | End: 2022-04-20

## 2022-03-22 RX ADMIN — Medication 2: at 08:47

## 2022-03-22 RX ADMIN — HEPARIN SODIUM 5000 UNIT(S): 5000 INJECTION INTRAVENOUS; SUBCUTANEOUS at 05:12

## 2022-03-22 RX ADMIN — NYSTATIN CREAM 1 APPLICATION(S): 100000 CREAM TOPICAL at 05:12

## 2022-03-22 NOTE — DISCHARGE NOTE PROVIDER - NSDCFUADDINST_GEN_ALL_CORE_FT
ensure pudding 3 times a day      Wound Specialist Recommendations: R buttock c 3 skin tear : 1) to clean c saline and pat dry 2) apply non adherent 3) cover with foam dressing. Change the dressing every 3 days or PRN. Sacrum : stage III: 1) to clean c saline and pat dry 2) cover with foam dressing. Change the dressing every 3 days or PRN. Coccyx : stage III: 1) to clean c saline and pat dry 2) apply collagenase daily 3) cover with foam dressing. Change the dressing daily or PRN. L heel c DTI:  1) to clean c steril water and pat dry 2) cover with foam dressing 3) to don Total cair boot to off load. Change the dressing every 3 days or PRN. Strict incontinence care, practice good hygiene, and aply moisture barier cream as needed. Proper positioning and change position every 2 hours to off load the pressure area and prevent skin breakdown. Optimize nutrition & hydration to promote wound healing.

## 2022-03-22 NOTE — DISCHARGE NOTE PROVIDER - ATTENDING DISCHARGE PHYSICAL EXAMINATION:
Vital Signs Last 24 Hrs  T(C): 36.1 (22 Mar 2022 05:19), Max: 37.5 (21 Mar 2022 16:27)  T(F): 96.9 (22 Mar 2022 05:19), Max: 99.5 (21 Mar 2022 16:27)  HR: 52 (22 Mar 2022 05:19) (51 - 121)  BP: 118/52 (22 Mar 2022 05:19) (100/46 - 118/52)  BP(mean): 74 (22 Mar 2022 05:19) (74 - 74)  RR: 18 (22 Mar 2022 05:19) (16 - 18)  SpO2: 98% (22 Mar 2022 05:19) (95% - 98%)    GENERAL: NAD, chronically ill appearing  HEAD:  Atraumatic, Normocephalic  EYES: EOMI, PERRLA, conjunctiva and sclera clear  ENMT: No tonsillar erythema, exudates, or enlargement;  No lesions  NECK: Supple  NERVOUS SYSTEM:  Alert & Oriented X0,  Motor Strength 3/5 B/L upper and lower extremities;  CHEST/LUNG: Clear to percussion bilaterally; No rales, rhonchi, wheezing, or rubs  HEART: S1 s2  No murmurs, rubs, or gallops  ABDOMEN: Soft, Nontender, Nondistended; Bowel sounds present  EXTREMITIES:  2+ Peripheral Pulses, No clubbing, cyanosis, or edema    SKIN: multiple  decubiti

## 2022-03-22 NOTE — DISCHARGE NOTE PROVIDER - NSDCMRMEDTOKEN_GEN_ALL_CORE_FT
aspirin 81 mg oral tablet, chewable: 1 tab(s) orally once a day  atorvastatin 20 mg oral tablet: 1 tab(s) orally once a day  collagenase 250 units/g topical ointment: 1 application topically once a day  glipiZIDE 10 mg oral tablet: 1 tab(s) orally 2 times a day  metoprolol succinate 25 mg oral tablet, extended release: 1 tab(s) orally once a day

## 2022-03-22 NOTE — DISCHARGE NOTE PROVIDER - DETAILS OF MALNUTRITION DIAGNOSIS/DIAGNOSES
This patient has been assessed with a concern for Malnutrition and was treated during this hospitalization for the following Nutrition diagnosis/diagnoses:     -  03/14/2022: Severe protein-calorie malnutrition   -  03/14/2022: Underweight (BMI < 19)

## 2022-03-22 NOTE — DISCHARGE NOTE PROVIDER - HOSPITAL COURSE
Assessment and Plan:   · Assessment	  81 year old female PMH DM, HLD, advanced dementia presented with altered mental status admitted for metabolic encephalopathy 2/2 UTI found to have to onset Afib.    New onset rapid afib on telemetry, Demand ischemia 2/2 dehydration-   afib  rate controlled    appreciate cardiology note   3/17/2022 d/w telemetry tech no afib patient  in fact has sinus arrythmia   3/18/2022 yesterday required IV push lopressor for elevated heart rate. appreciate cardiology note beta blocker was increased will monitor.   3/19/2022 hr stable . 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 appreciate cardiology consult, medical management       Metabolic encephalopathy 2/2 UTI and advanced dementia  Speech swallow study: passed    urine cultures: Proteus sensitive to ceftriaxone   s/p treatment    Dehydration  s/p LR bolus   resolved    Advanced dementia  Palliative care consult -noted    Hyperglycemia 2/2 uncontrolled diabetes resolved    can resume oral meds upon discharge       HyperNa+ resolved      multiple decubit will get wound consult   3/20/2022 recommendations noted and were ordered at time of their evaluation     functional quadriplegia : bedbound for >3 months , not a rehab candidate    DVT PPX  heparin     dispo Hospice- note from 3/17/2022 reviewed placement will be difficult as its just the  and wife and he is unable to take care of her and home Hospice will likely be unsafe as he is the only caregiver.  eval and placement in progress ...    3/18/2022  d/w  at [facility] and he wants to take patient home . per SW note who spoke with daughter also the family feels that can manage patient at home as of conversation 3/17/2022   arranging for hospital bed  and other equipment to  be placed   3/19/2022 f/u with sw on equipment placement , sw is aware  3/20/2022 per family unable to take patient home until Tuesday see SW note for more details , family has not  agreed for dnr       Nutritional Assessment:  · Nutritional Assessment	This patient has been assessed with a concern for Malnutrition and has been determined to have a diagnosis/diagnoses of Severe protein-calorie malnutrition and Underweight (BMI < 19).

## 2022-03-22 NOTE — DISCHARGE NOTE PROVIDER - NSDCCPCAREPLAN_GEN_ALL_CORE_FT
PRINCIPAL DISCHARGE DIAGNOSIS  Diagnosis: Chronic combined systolic and diastolic heart failure  Assessment and Plan of Treatment: presumed chronic      SECONDARY DISCHARGE DIAGNOSES  Diagnosis: Dementia  Assessment and Plan of Treatment:     Diagnosis: Severe protein-calorie malnutrition  Assessment and Plan of Treatment:     Diagnosis: UTI (urinary tract infection)  Assessment and Plan of Treatment:     Diagnosis: Functional quadriplegia  Assessment and Plan of Treatment:     Diagnosis: Sinus arrhythmia  Assessment and Plan of Treatment:     Diagnosis: Hypernatremia  Assessment and Plan of Treatment:     Diagnosis: DM (diabetes mellitus)  Assessment and Plan of Treatment:     Diagnosis: Sacral decubitus ulcer  Assessment and Plan of Treatment:

## 2022-03-24 DIAGNOSIS — I50.42 CHRONIC COMBINED SYSTOLIC (CONGESTIVE) AND DIASTOLIC (CONGESTIVE) HEART FAILURE: ICD-10-CM

## 2022-03-24 DIAGNOSIS — I48.91 UNSPECIFIED ATRIAL FIBRILLATION: ICD-10-CM

## 2022-03-24 DIAGNOSIS — R41.0 DISORIENTATION, UNSPECIFIED: ICD-10-CM

## 2022-03-24 DIAGNOSIS — G93.41 METABOLIC ENCEPHALOPATHY: ICD-10-CM

## 2022-03-24 DIAGNOSIS — R53.2 FUNCTIONAL QUADRIPLEGIA: ICD-10-CM

## 2022-03-24 DIAGNOSIS — E87.0 HYPEROSMOLALITY AND HYPERNATREMIA: ICD-10-CM

## 2022-03-24 DIAGNOSIS — E78.5 HYPERLIPIDEMIA, UNSPECIFIED: ICD-10-CM

## 2022-03-24 DIAGNOSIS — I49.8 OTHER SPECIFIED CARDIAC ARRHYTHMIAS: ICD-10-CM

## 2022-03-24 DIAGNOSIS — E86.0 DEHYDRATION: ICD-10-CM

## 2022-03-24 DIAGNOSIS — E43 UNSPECIFIED SEVERE PROTEIN-CALORIE MALNUTRITION: ICD-10-CM

## 2022-03-24 DIAGNOSIS — R41.82 ALTERED MENTAL STATUS, UNSPECIFIED: ICD-10-CM

## 2022-03-24 DIAGNOSIS — N39.0 URINARY TRACT INFECTION, SITE NOT SPECIFIED: ICD-10-CM

## 2022-03-24 DIAGNOSIS — Z51.5 ENCOUNTER FOR PALLIATIVE CARE: ICD-10-CM

## 2022-03-24 DIAGNOSIS — L89.152 PRESSURE ULCER OF SACRAL REGION, STAGE 2: ICD-10-CM

## 2022-03-24 DIAGNOSIS — F03.90 UNSPECIFIED DEMENTIA WITHOUT BEHAVIORAL DISTURBANCE: ICD-10-CM

## 2022-03-24 DIAGNOSIS — E11.65 TYPE 2 DIABETES MELLITUS WITH HYPERGLYCEMIA: ICD-10-CM

## 2022-03-24 DIAGNOSIS — I24.8 OTHER FORMS OF ACUTE ISCHEMIC HEART DISEASE: ICD-10-CM

## 2022-03-24 DIAGNOSIS — Z74.01 BED CONFINEMENT STATUS: ICD-10-CM

## 2023-02-03 NOTE — ED ADULT TRIAGE NOTE - TEMPERATURE IN CELSIUS (DEGREES C)
Spoke with patient, name  approved. Notify pt \"per Dr. Annie Shafer stress test was normal.\"    Verified next office visit date and time. Pt verbalized understanding. 36.6

## 2023-09-21 NOTE — PROGRESS NOTE ADULT - PROBLEM SELECTOR PLAN 8
Spoke with patient's , Vincent at bedside [phone].  He said he has been taking care of patient at home and has 8 children, 3 of whom are local.  He would like to bring the patient home, but with more assistance.  Discussed disease trajectory with dementia and explored options such as hospice.  He was amenable to hospice referral. Also provided MOLST form for his review with his children and discussed advanced directives.  He asked that I speak with his daughter, Winsome [phone].  We discussed hospice and advanced directives/MOLST.  She said she will speak to her father and they will reach out after making a decision as a family.  Patient remains full code; however patient with advanced dementia and interventions such as ACLS likely would not change patient's outcome and cause undue suffering and burden.  Hospice referral pending.    Discussed with Dr. Becerril
Patient pending hospice referral.  Spoke with  Vincent at bedside and daughter, Winsome over the phone yesterday to discuss advanced directives and hospice eval.  They were agreeable to hospice eval and were going to discuss advanced directives.
Stable.

## 2023-10-20 NOTE — ED PROVIDER NOTE - PRIOR EKG STATUS
there is no prior EKG available for comparison Keystone Flap Text: The defect edges were debeveled with a #15 scalpel blade.  Given the location of the defect, shape of the defect a keystone flap was deemed most appropriate.  Using a sterile surgical marker, an appropriate keystone flap was drawn incorporating the defect, outlining the appropriate donor tissue and placing the expected incisions within the relaxed skin tension lines where possible. The area thus outlined was incised deep to adipose tissue with a #15 scalpel blade.  The skin margins were undermined to an appropriate distance in all directions around the primary defect and laterally outward around the flap utilizing iris scissors.